# Patient Record
Sex: FEMALE | Race: WHITE | NOT HISPANIC OR LATINO | Employment: FULL TIME | URBAN - METROPOLITAN AREA
[De-identification: names, ages, dates, MRNs, and addresses within clinical notes are randomized per-mention and may not be internally consistent; named-entity substitution may affect disease eponyms.]

---

## 2017-06-15 ENCOUNTER — OFFICE VISIT (OUTPATIENT)
Dept: OBSTETRICS AND GYNECOLOGY | Facility: CLINIC | Age: 27
End: 2017-06-15
Attending: OBSTETRICS & GYNECOLOGY
Payer: COMMERCIAL

## 2017-06-15 VITALS
HEIGHT: 63 IN | WEIGHT: 101.31 LBS | DIASTOLIC BLOOD PRESSURE: 78 MMHG | BODY MASS INDEX: 17.95 KG/M2 | SYSTOLIC BLOOD PRESSURE: 128 MMHG

## 2017-06-15 DIAGNOSIS — Z01.419 ENCOUNTER FOR GYNECOLOGICAL EXAMINATION: Primary | ICD-10-CM

## 2017-06-15 PROBLEM — F41.1 ANXIETY, GENERALIZED: Status: ACTIVE | Noted: 2017-04-03

## 2017-06-15 PROBLEM — E55.9 UNSPECIFIED VITAMIN D DEFICIENCY: Status: ACTIVE | Noted: 2017-04-03

## 2017-06-15 PROCEDURE — 99999 PR PBB SHADOW E&M-EST. PATIENT-LVL II: CPT | Mod: PBBFAC,,, | Performed by: OBSTETRICS & GYNECOLOGY

## 2017-06-15 PROCEDURE — 99395 PREV VISIT EST AGE 18-39: CPT | Mod: S$GLB,,, | Performed by: OBSTETRICS & GYNECOLOGY

## 2017-06-15 RX ORDER — NORETHINDRONE ACETATE AND ETHINYL ESTRADIOL, AND FERROUS FUMARATE 1MG-20(24)
1 KIT ORAL DAILY
Qty: 84 CAPSULE | Refills: 3 | Status: SHIPPED | OUTPATIENT
Start: 2017-06-15 | End: 2018-03-06 | Stop reason: SDUPTHER

## 2017-06-15 RX ORDER — ERGOCALCIFEROL 1.25 MG/1
50000 CAPSULE ORAL
COMMUNITY
Start: 2017-04-04 | End: 2018-04-04

## 2017-06-15 NOTE — PROGRESS NOTES
"CC: Well woman exam    Juanita Redman is a 26 y.o. female  presents for a well woman exam. Got engaged and getting  wants ocps.  Using condoms    Past Medical History:   Diagnosis Date    Anxiety     Vitamin D deficiency        History reviewed. No pertinent surgical history.    OB History    Para Term  AB Living   0 0 0 0 0 0   SAB TAB Ectopic Multiple Live Births   0 0 0 0              Family History   Problem Relation Age of Onset    Hypertension Father     Breast cancer Neg Hx     Colon cancer Neg Hx     Ovarian cancer Neg Hx        Social History   Substance Use Topics    Smoking status: Never Smoker    Smokeless tobacco: Not on file    Alcohol use Yes      Comment: rare       /78   Ht 5' 3" (1.6 m)   Wt 45.9 kg (101 lb 4.8 oz)   LMP 2017 (Exact Date)   BMI 17.94 kg/m²     ROS:  GENERAL: Denies weight gain or weight loss. Feeling well overall.   SKIN: Denies rash or lesions.   HEAD: Denies head injury or headache.   NODES: Denies enlarged lymph nodes.   CHEST: Denies chest pain or shortness of breath.   CARDIOVASCULAR: Denies palpitations or left sided chest pain.   ABDOMEN: No abdominal pain, constipation, diarrhea, nausea, vomiting or rectal bleeding.   URINARY: No frequency, dysuria, hematuria, or burning on urination.  REPRODUCTIVE: See HPI.   BREASTS: The patient performs breast self-examination and denies pain, lumps, or nipple discharge.   HEMATOLOGIC: No easy bruisability or excessive bleeding.  MUSCULOSKELETAL: Denies joint pain or swelling.   NEUROLOGIC: Denies syncope or weakness.   PSYCHIATRIC: Denies depression, anxiety or mood swings.    Physical Exam:    APPEARANCE: Well nourished, well developed, in no acute distress.  AFFECT: WNL, alert and oriented x 3  SKIN: No acne or hirsutism  NECK: Neck symmetric without masses or thyromegaly  NODES: No inguinal, cervical, axillary, or femoral lymph node enlargement  CHEST: Good respiratory " effect  ABDOMEN: Soft.  No tenderness or masses.  No hepatosplenomegaly.  No hernias.  BREASTS: Symmetrical, no skin changes or visible lesions.  No palpable masses, nipple discharge bilaterally.  PELVIC: Normal external genitalia without lesions.  Normal hair distribution.  Adequate perineal body, normal urethral meatus.  Vagina moist and well rugated without lesions or discharge.  Cervix pink, without lesions, discharge or tenderness.  No significant cystocele or rectocele.  Bimanual exam shows uterus to be normal size, regular, mobile and nontender.  Adnexa without masses or tenderness.    EXTREMITIES: No edema.    ASSESSMENT AND PLAN  1. Encounter for gynecological examination         Patient was counseled today on A.C.S. Pap guidelines and recommendations for yearly pelvic exams, mammograms and monthly self breast exams; to see her PCP for other health maintenance.     Return in about 1 year (around 6/15/2018).

## 2017-06-30 ENCOUNTER — TELEPHONE (OUTPATIENT)
Dept: OBSTETRICS AND GYNECOLOGY | Facility: CLINIC | Age: 27
End: 2017-06-30

## 2017-06-30 NOTE — TELEPHONE ENCOUNTER
Reassured her she can start taking Taytulla on the 2nd day of her period.  She started her period yesterday but had a headache last night and the pharmacy recommended she not take it while she had a headache.

## 2017-06-30 NOTE — TELEPHONE ENCOUNTER
Rashawn pt, was told by Dr Morocho to start taking taytulla the night she starts her period. Pt started period yesterday but had headache last night and pharmacist told her not to take the meds until her headache went away but it did not go away last night. Should pt start meds tonight on second day of period or wait until period is over?

## 2018-02-20 ENCOUNTER — TELEPHONE (OUTPATIENT)
Dept: OBSTETRICS AND GYNECOLOGY | Facility: CLINIC | Age: 28
End: 2018-02-20

## 2018-02-20 NOTE — TELEPHONE ENCOUNTER
Wax pt states she has been taking taytulla for aprox 6/7 months, pt had normal bleeding in the beginning, but now, yesterday pt started spotting on sugar pills, today pt states she is bleeding like a real period, bright red, passed a small clot. Pt not sure what is going on. Pt cycle has been consistent, up until now.    [phone]

## 2018-02-21 NOTE — TELEPHONE ENCOUNTER
Pt has been on Taytulla for 7 months. She is in the 2nd week of pill pack and missed a pill Monday night but took it the following morning.  Yesterday she woke up with hot flashes and brown spotting.  During the day it increased and was bright red with moderate cramping then went back to spotting last night.  No symptoms this AM. Negative UPT.  Reassured her that the bleeding is probably due to missing a pill earlier in the week.  Advised if she has BTB without missing a pill to call for an US.  She does not want to switch OCP, she has done well on this pill.     Pt states in October she missed 2 pills in the first week then doubled up Sunday and Monday but didn't have any bleeding.  Advised it was probably because it was in the first week and she doubled up for 2 days.

## 2018-03-06 RX ORDER — NORETHINDRONE ACETATE AND ETHINYL ESTRADIOL, AND FERROUS FUMARATE 1MG-20(24)
1 KIT ORAL DAILY
Qty: 84 CAPSULE | Refills: 1 | Status: SHIPPED | OUTPATIENT
Start: 2018-03-06 | End: 2018-06-22

## 2018-04-02 ENCOUNTER — TELEPHONE (OUTPATIENT)
Dept: OBSTETRICS AND GYNECOLOGY | Facility: CLINIC | Age: 28
End: 2018-04-02

## 2018-04-02 NOTE — TELEPHONE ENCOUNTER
Pt said about 3 weeks ago she started with a discomfort in her upper right inner thigh. She said it is not really a pain and it is not red or swollen and there is no extra pain with applied pressure. She has tried taking tylenol but it does not mask the pain completely. Pt is worried it is something related to her ocp. Pt said she can call her PCP if she needs to but wanted to check with  first.

## 2018-04-02 NOTE — TELEPHONE ENCOUNTER
Spoke with pt, states about 3 wks ago, started with dull ache on right inner thigh towards knee cap. Doesn't change location or intensity unless area aggravated. States her grandmother who is a nurse took a look at the area but found no visible abnormality like redness or swelling. States she still is exercising as normal, no other changes to lifestyle. Reassured pt that she is low risk for DVT, likely not the case. Confirmed with pt she can seek care from PCP to see about possibility of musculoskeletal or other injury. Pt agrees, states she is being over-cautious.

## 2018-06-22 ENCOUNTER — OFFICE VISIT (OUTPATIENT)
Dept: OBSTETRICS AND GYNECOLOGY | Facility: CLINIC | Age: 28
End: 2018-06-22
Payer: COMMERCIAL

## 2018-06-22 VITALS
BODY MASS INDEX: 18.44 KG/M2 | HEIGHT: 63 IN | DIASTOLIC BLOOD PRESSURE: 78 MMHG | SYSTOLIC BLOOD PRESSURE: 126 MMHG | WEIGHT: 104.06 LBS

## 2018-06-22 DIAGNOSIS — Z01.419 ENCOUNTER FOR GYNECOLOGICAL EXAMINATION: Primary | ICD-10-CM

## 2018-06-22 PROCEDURE — 99999 PR PBB SHADOW E&M-EST. PATIENT-LVL III: CPT | Mod: PBBFAC,,, | Performed by: OBSTETRICS & GYNECOLOGY

## 2018-06-22 PROCEDURE — 99395 PREV VISIT EST AGE 18-39: CPT | Mod: S$GLB,,, | Performed by: OBSTETRICS & GYNECOLOGY

## 2018-06-22 NOTE — PROGRESS NOTES
"CC: Well woman exam    Juanita Taylor is a 27 y.o. female  presents for a well woman exam.  Last pap was 2016 was normal.  Off ocps 1 month and wants to get pregnant.  [facility] [place] [facility] to there.  Is a consult with new gyn there.      Past Medical History:   Diagnosis Date    Anxiety     Vitamin D deficiency        History reviewed. No pertinent surgical history.    OB History    Para Term  AB Living   0 0 0 0 0 0   SAB TAB Ectopic Multiple Live Births   0 0 0 0               Family History   Problem Relation Age of Onset    Hypertension Father     Breast cancer Neg Hx     Colon cancer Neg Hx     Ovarian cancer Neg Hx        Social History   Substance Use Topics    Smoking status: Never Smoker    Smokeless tobacco: Never Used    Alcohol use No       /78   Ht 5' 3" (1.6 m)   Wt 47.2 kg (104 lb 0.9 oz)   LMP 2018   BMI 18.43 kg/m²     ROS:  GENERAL: Denies weight gain or weight loss. Feeling well overall.   SKIN: Denies rash or lesions.   HEAD: Denies head injury or headache.   NODES: Denies enlarged lymph nodes.   CHEST: Denies chest pain or shortness of breath.   CARDIOVASCULAR: Denies palpitations or left sided chest pain.   ABDOMEN: No abdominal pain, constipation, diarrhea, nausea, vomiting or rectal bleeding.   URINARY: No frequency, dysuria, hematuria, or burning on urination.  REPRODUCTIVE: See HPI.   BREASTS: The patient performs breast self-examination and denies pain, lumps, or nipple discharge.   HEMATOLOGIC: No easy bruisability or excessive bleeding.  MUSCULOSKELETAL: Denies joint pain or swelling.   NEUROLOGIC: Denies syncope or weakness.   PSYCHIATRIC: Denies depression, anxiety or mood swings.    Physical Exam:    APPEARANCE: Well nourished, well developed, in no acute distress.  AFFECT: WNL, alert and oriented x 3  SKIN: No acne or hirsutism  NECK: Neck symmetric without masses or thyromegaly  NODES: No inguinal, " cervical, axillary, or femoral lymph node enlargement  CHEST: Good respiratory effect  ABDOMEN: Soft.  No tenderness or masses.  No hepatosplenomegaly.  No hernias.  BREASTS: Symmetrical, no skin changes or visible lesions.  No palpable masses, nipple discharge bilaterally.  PELVIC: Normal external genitalia without lesions.  Normal hair distribution.  Adequate perineal body, normal urethral meatus.  Vagina moist and well rugated without lesions or discharge.  Cervix pink, without lesions, discharge or tenderness.  No significant cystocele or rectocele.  Bimanual exam shows uterus to be normal size, regular, mobile and nontender.  Adnexa without masses or tenderness.    EXTREMITIES: No edema.    ASSESSMENT AND PLAN  1. Encounter for gynecological examination         Patient was counseled today on A.C.S. Pap guidelines and recommendations for yearly pelvic exams, mammograms and monthly self breast exams; to see her PCP for other health maintenance.     Folic acid.     Follow-up in about 1 year (around 6/22/2019).

## 2018-06-26 ENCOUNTER — PATIENT MESSAGE (OUTPATIENT)
Dept: OBSTETRICS AND GYNECOLOGY | Facility: CLINIC | Age: 28
End: 2018-06-26

## 2018-07-05 ENCOUNTER — TELEPHONE (OUTPATIENT)
Dept: OBSTETRICS AND GYNECOLOGY | Facility: CLINIC | Age: 28
End: 2018-07-05

## 2018-07-05 NOTE — TELEPHONE ENCOUNTER
Dr. Morocho-- pt would like to speak to Ramya. Would not say what it was regarding. Pt's # [phone]

## 2018-07-05 NOTE — TELEPHONE ENCOUNTER
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 States if she does not like her though, will come back to Dr. Morocho like they talked about at her recent appt.

## 2021-01-26 ENCOUNTER — TELEPHONE (OUTPATIENT)
Dept: PRIMARY CARE CLINIC | Facility: CLINIC | Age: 31
End: 2021-01-26

## 2021-01-26 ENCOUNTER — CLINICAL SUPPORT (OUTPATIENT)
Dept: URGENT CARE | Facility: CLINIC | Age: 31
End: 2021-01-26
Payer: COMMERCIAL

## 2021-01-26 DIAGNOSIS — J98.8 CONGESTION OF UPPER AIRWAY: ICD-10-CM

## 2021-01-26 DIAGNOSIS — R09.89 RUNNY NOSE: Primary | ICD-10-CM

## 2021-01-26 DIAGNOSIS — R09.89 RUNNY NOSE: ICD-10-CM

## 2021-01-26 LAB
CTP QC/QA: YES
SARS-COV-2 RDRP RESP QL NAA+PROBE: NEGATIVE

## 2021-01-26 PROCEDURE — U0002: ICD-10-PCS | Mod: QW,S$GLB,, | Performed by: INTERNAL MEDICINE

## 2021-01-26 PROCEDURE — U0002 COVID-19 LAB TEST NON-CDC: HCPCS | Mod: QW,S$GLB,, | Performed by: INTERNAL MEDICINE

## 2021-02-05 ENCOUNTER — CLINICAL SUPPORT (OUTPATIENT)
Dept: URGENT CARE | Facility: CLINIC | Age: 31
End: 2021-02-05
Payer: COMMERCIAL

## 2021-02-05 DIAGNOSIS — Z20.822 EXPOSURE TO COVID-19 VIRUS: Primary | ICD-10-CM

## 2021-02-05 LAB
CTP QC/QA: YES
SARS-COV-2 RDRP RESP QL NAA+PROBE: NEGATIVE

## 2021-02-05 PROCEDURE — 99211 PR OFFICE/OUTPT VISIT, EST, LEVL I: ICD-10-PCS | Mod: S$GLB,CS,, | Performed by: NURSE PRACTITIONER

## 2021-02-05 PROCEDURE — U0002: ICD-10-PCS | Mod: QW,S$GLB,, | Performed by: NURSE PRACTITIONER

## 2021-02-05 PROCEDURE — U0002 COVID-19 LAB TEST NON-CDC: HCPCS | Mod: QW,S$GLB,, | Performed by: NURSE PRACTITIONER

## 2021-02-05 PROCEDURE — 99211 OFF/OP EST MAY X REQ PHY/QHP: CPT | Mod: S$GLB,CS,, | Performed by: NURSE PRACTITIONER

## 2021-04-29 ENCOUNTER — PATIENT MESSAGE (OUTPATIENT)
Dept: RESEARCH | Facility: HOSPITAL | Age: 31
End: 2021-04-29

## 2021-06-29 ENCOUNTER — OFFICE VISIT (OUTPATIENT)
Dept: URGENT CARE | Facility: CLINIC | Age: 31
End: 2021-06-29
Payer: COMMERCIAL

## 2021-06-29 VITALS
DIASTOLIC BLOOD PRESSURE: 71 MMHG | OXYGEN SATURATION: 100 % | SYSTOLIC BLOOD PRESSURE: 108 MMHG | HEIGHT: 63 IN | RESPIRATION RATE: 16 BRPM | HEART RATE: 78 BPM | BODY MASS INDEX: 18.07 KG/M2 | TEMPERATURE: 99 F | WEIGHT: 102 LBS

## 2021-06-29 DIAGNOSIS — R09.82 ALLERGIC RHINITIS WITH POSTNASAL DRIP: Primary | ICD-10-CM

## 2021-06-29 DIAGNOSIS — J30.9 ALLERGIC RHINITIS WITH POSTNASAL DRIP: Primary | ICD-10-CM

## 2021-06-29 DIAGNOSIS — R09.81 NASAL CONGESTION: ICD-10-CM

## 2021-06-29 PROCEDURE — 99213 OFFICE O/P EST LOW 20 MIN: CPT | Mod: S$GLB,,, | Performed by: NURSE PRACTITIONER

## 2021-06-29 PROCEDURE — 3008F PR BODY MASS INDEX (BMI) DOCUMENTED: ICD-10-PCS | Mod: CPTII,S$GLB,, | Performed by: NURSE PRACTITIONER

## 2021-06-29 PROCEDURE — 1126F AMNT PAIN NOTED NONE PRSNT: CPT | Mod: S$GLB,,, | Performed by: NURSE PRACTITIONER

## 2021-06-29 PROCEDURE — 99213 PR OFFICE/OUTPT VISIT, EST, LEVL III, 20-29 MIN: ICD-10-PCS | Mod: S$GLB,,, | Performed by: NURSE PRACTITIONER

## 2021-06-29 PROCEDURE — 1126F PR PAIN SEVERITY QUANTIFIED, NO PAIN PRESENT: ICD-10-PCS | Mod: S$GLB,,, | Performed by: NURSE PRACTITIONER

## 2021-06-29 PROCEDURE — 3008F BODY MASS INDEX DOCD: CPT | Mod: CPTII,S$GLB,, | Performed by: NURSE PRACTITIONER

## 2021-06-29 RX ORDER — DEXBROMPHENIRAMINE MALEATE AND PHENYLEPHRINE HYDROCHLORIDE 2; 10 MG/1; MG/1
1 TABLET ORAL EVERY 6 HOURS PRN
Qty: 28 TABLET | Refills: 0 | Status: SHIPPED | OUTPATIENT
Start: 2021-06-29 | End: 2021-11-02

## 2021-06-29 RX ORDER — PREDNISONE 20 MG/1
20 TABLET ORAL 2 TIMES DAILY
Qty: 10 TABLET | Refills: 0 | Status: SHIPPED | OUTPATIENT
Start: 2021-06-29 | End: 2021-07-04

## 2021-07-02 ENCOUNTER — TELEPHONE (OUTPATIENT)
Dept: URGENT CARE | Facility: CLINIC | Age: 31
End: 2021-07-02

## 2021-10-21 ENCOUNTER — OFFICE VISIT (OUTPATIENT)
Dept: URGENT CARE | Facility: CLINIC | Age: 31
End: 2021-10-21
Payer: COMMERCIAL

## 2021-10-21 VITALS
SYSTOLIC BLOOD PRESSURE: 111 MMHG | WEIGHT: 102 LBS | DIASTOLIC BLOOD PRESSURE: 69 MMHG | HEIGHT: 63 IN | BODY MASS INDEX: 18.07 KG/M2 | TEMPERATURE: 98 F | OXYGEN SATURATION: 98 % | HEART RATE: 95 BPM | RESPIRATION RATE: 18 BRPM

## 2021-10-21 DIAGNOSIS — J06.9 VIRAL URI WITH COUGH: Primary | ICD-10-CM

## 2021-10-21 DIAGNOSIS — Z20.822 ENCOUNTER FOR LABORATORY TESTING FOR COVID-19 VIRUS: ICD-10-CM

## 2021-10-21 LAB
CTP QC/QA: YES
SARS-COV-2 RDRP RESP QL NAA+PROBE: NEGATIVE

## 2021-10-21 PROCEDURE — 3078F PR MOST RECENT DIASTOLIC BLOOD PRESSURE < 80 MM HG: ICD-10-PCS | Mod: CPTII,S$GLB,, | Performed by: STUDENT IN AN ORGANIZED HEALTH CARE EDUCATION/TRAINING PROGRAM

## 2021-10-21 PROCEDURE — 3008F PR BODY MASS INDEX (BMI) DOCUMENTED: ICD-10-PCS | Mod: CPTII,S$GLB,, | Performed by: STUDENT IN AN ORGANIZED HEALTH CARE EDUCATION/TRAINING PROGRAM

## 2021-10-21 PROCEDURE — 3074F PR MOST RECENT SYSTOLIC BLOOD PRESSURE < 130 MM HG: ICD-10-PCS | Mod: CPTII,S$GLB,, | Performed by: STUDENT IN AN ORGANIZED HEALTH CARE EDUCATION/TRAINING PROGRAM

## 2021-10-21 PROCEDURE — 1159F PR MEDICATION LIST DOCUMENTED IN MEDICAL RECORD: ICD-10-PCS | Mod: CPTII,S$GLB,, | Performed by: STUDENT IN AN ORGANIZED HEALTH CARE EDUCATION/TRAINING PROGRAM

## 2021-10-21 PROCEDURE — U0002: ICD-10-PCS | Mod: QW,S$GLB,, | Performed by: STUDENT IN AN ORGANIZED HEALTH CARE EDUCATION/TRAINING PROGRAM

## 2021-10-21 PROCEDURE — 3078F DIAST BP <80 MM HG: CPT | Mod: CPTII,S$GLB,, | Performed by: STUDENT IN AN ORGANIZED HEALTH CARE EDUCATION/TRAINING PROGRAM

## 2021-10-21 PROCEDURE — 1159F MED LIST DOCD IN RCRD: CPT | Mod: CPTII,S$GLB,, | Performed by: STUDENT IN AN ORGANIZED HEALTH CARE EDUCATION/TRAINING PROGRAM

## 2021-10-21 PROCEDURE — U0002 COVID-19 LAB TEST NON-CDC: HCPCS | Mod: QW,S$GLB,, | Performed by: STUDENT IN AN ORGANIZED HEALTH CARE EDUCATION/TRAINING PROGRAM

## 2021-10-21 PROCEDURE — 99213 PR OFFICE/OUTPT VISIT, EST, LEVL III, 20-29 MIN: ICD-10-PCS | Mod: S$GLB,,, | Performed by: STUDENT IN AN ORGANIZED HEALTH CARE EDUCATION/TRAINING PROGRAM

## 2021-10-21 PROCEDURE — 3008F BODY MASS INDEX DOCD: CPT | Mod: CPTII,S$GLB,, | Performed by: STUDENT IN AN ORGANIZED HEALTH CARE EDUCATION/TRAINING PROGRAM

## 2021-10-21 PROCEDURE — 3074F SYST BP LT 130 MM HG: CPT | Mod: CPTII,S$GLB,, | Performed by: STUDENT IN AN ORGANIZED HEALTH CARE EDUCATION/TRAINING PROGRAM

## 2021-10-21 PROCEDURE — 1160F PR REVIEW ALL MEDS BY PRESCRIBER/CLIN PHARMACIST DOCUMENTED: ICD-10-PCS | Mod: CPTII,S$GLB,, | Performed by: STUDENT IN AN ORGANIZED HEALTH CARE EDUCATION/TRAINING PROGRAM

## 2021-10-21 PROCEDURE — 99213 OFFICE O/P EST LOW 20 MIN: CPT | Mod: S$GLB,,, | Performed by: STUDENT IN AN ORGANIZED HEALTH CARE EDUCATION/TRAINING PROGRAM

## 2021-10-21 PROCEDURE — 1160F RVW MEDS BY RX/DR IN RCRD: CPT | Mod: CPTII,S$GLB,, | Performed by: STUDENT IN AN ORGANIZED HEALTH CARE EDUCATION/TRAINING PROGRAM

## 2021-10-24 ENCOUNTER — TELEPHONE (OUTPATIENT)
Dept: URGENT CARE | Facility: CLINIC | Age: 31
End: 2021-10-24
Payer: COMMERCIAL

## 2021-12-30 ENCOUNTER — LAB VISIT (OUTPATIENT)
Dept: LAB | Facility: HOSPITAL | Age: 31
End: 2021-12-30
Attending: OBSTETRICS & GYNECOLOGY
Payer: COMMERCIAL

## 2021-12-30 DIAGNOSIS — Z36.9 UNSPECIFIED ANTENATAL SCREENING: Primary | ICD-10-CM

## 2021-12-30 DIAGNOSIS — Z36.9 UNSPECIFIED ANTENATAL SCREENING: ICD-10-CM

## 2021-12-30 LAB
BASOPHILS # BLD AUTO: 0.04 K/UL (ref 0–0.2)
BASOPHILS NFR BLD: 0.5 % (ref 0–1.9)
DIFFERENTIAL METHOD: ABNORMAL
EOSINOPHIL # BLD AUTO: 0 K/UL (ref 0–0.5)
EOSINOPHIL NFR BLD: 0.3 % (ref 0–8)
ERYTHROCYTE [DISTWIDTH] IN BLOOD BY AUTOMATED COUNT: 12.9 % (ref 11.5–14.5)
GLUCOSE SERPL-MCNC: 80 MG/DL (ref 70–140)
HCT VFR BLD AUTO: 37.4 % (ref 37–48.5)
HGB BLD-MCNC: 12 G/DL (ref 12–16)
IMM GRANULOCYTES # BLD AUTO: 0.13 K/UL (ref 0–0.04)
IMM GRANULOCYTES NFR BLD AUTO: 1.7 % (ref 0–0.5)
LYMPHOCYTES # BLD AUTO: 1.3 K/UL (ref 1–4.8)
LYMPHOCYTES NFR BLD: 16.7 % (ref 18–48)
MCH RBC QN AUTO: 35.4 PG (ref 27–31)
MCHC RBC AUTO-ENTMCNC: 32.1 G/DL (ref 32–36)
MCV RBC AUTO: 110 FL (ref 82–98)
MONOCYTES # BLD AUTO: 0.6 K/UL (ref 0.3–1)
MONOCYTES NFR BLD: 7.6 % (ref 4–15)
NEUTROPHILS # BLD AUTO: 5.6 K/UL (ref 1.8–7.7)
NEUTROPHILS NFR BLD: 73.2 % (ref 38–73)
NRBC BLD-RTO: 0 /100 WBC
PLATELET # BLD AUTO: 189 K/UL (ref 150–450)
PMV BLD AUTO: 10.9 FL (ref 9.2–12.9)
RBC # BLD AUTO: 3.39 M/UL (ref 4–5.4)
WBC # BLD AUTO: 7.66 K/UL (ref 3.9–12.7)

## 2021-12-30 PROCEDURE — 85025 COMPLETE CBC W/AUTO DIFF WBC: CPT | Performed by: OBSTETRICS & GYNECOLOGY

## 2021-12-30 PROCEDURE — 82950 GLUCOSE TEST: CPT | Performed by: OBSTETRICS & GYNECOLOGY

## 2021-12-30 PROCEDURE — 87389 HIV-1 AG W/HIV-1&-2 AB AG IA: CPT | Performed by: OBSTETRICS & GYNECOLOGY

## 2021-12-30 PROCEDURE — 36415 COLL VENOUS BLD VENIPUNCTURE: CPT | Performed by: OBSTETRICS & GYNECOLOGY

## 2021-12-30 PROCEDURE — 86592 SYPHILIS TEST NON-TREP QUAL: CPT | Performed by: OBSTETRICS & GYNECOLOGY

## 2021-12-31 LAB
HIV 1+2 AB+HIV1 P24 AG SERPL QL IA: NEGATIVE
RPR SER QL: NORMAL

## 2022-02-03 ENCOUNTER — OFFICE VISIT (OUTPATIENT)
Dept: URGENT CARE | Facility: CLINIC | Age: 32
End: 2022-02-03
Payer: COMMERCIAL

## 2022-02-03 VITALS
BODY MASS INDEX: 20.2 KG/M2 | DIASTOLIC BLOOD PRESSURE: 66 MMHG | SYSTOLIC BLOOD PRESSURE: 116 MMHG | HEART RATE: 93 BPM | TEMPERATURE: 98 F | WEIGHT: 114 LBS | RESPIRATION RATE: 14 BRPM | HEIGHT: 63 IN | OXYGEN SATURATION: 100 %

## 2022-02-03 DIAGNOSIS — J06.9 VIRAL URI: ICD-10-CM

## 2022-02-03 DIAGNOSIS — R09.81 SINUS CONGESTION: ICD-10-CM

## 2022-02-03 DIAGNOSIS — R09.81 NASAL CONGESTION: Primary | ICD-10-CM

## 2022-02-03 LAB
CTP QC/QA: YES
SARS-COV-2 RDRP RESP QL NAA+PROBE: NEGATIVE

## 2022-02-03 PROCEDURE — 3074F PR MOST RECENT SYSTOLIC BLOOD PRESSURE < 130 MM HG: ICD-10-PCS | Mod: CPTII,S$GLB,, | Performed by: INTERNAL MEDICINE

## 2022-02-03 PROCEDURE — 3008F PR BODY MASS INDEX (BMI) DOCUMENTED: ICD-10-PCS | Mod: CPTII,S$GLB,, | Performed by: INTERNAL MEDICINE

## 2022-02-03 PROCEDURE — U0002 COVID-19 LAB TEST NON-CDC: HCPCS | Mod: QW,S$GLB,, | Performed by: INTERNAL MEDICINE

## 2022-02-03 PROCEDURE — 3078F DIAST BP <80 MM HG: CPT | Mod: CPTII,S$GLB,, | Performed by: INTERNAL MEDICINE

## 2022-02-03 PROCEDURE — 99211 PR OFFICE/OUTPT VISIT, EST, LEVL I: ICD-10-PCS | Mod: S$GLB,,, | Performed by: INTERNAL MEDICINE

## 2022-02-03 PROCEDURE — 99211 OFF/OP EST MAY X REQ PHY/QHP: CPT | Mod: S$GLB,,, | Performed by: INTERNAL MEDICINE

## 2022-02-03 PROCEDURE — 3008F BODY MASS INDEX DOCD: CPT | Mod: CPTII,S$GLB,, | Performed by: INTERNAL MEDICINE

## 2022-02-03 PROCEDURE — 1159F MED LIST DOCD IN RCRD: CPT | Mod: CPTII,S$GLB,, | Performed by: INTERNAL MEDICINE

## 2022-02-03 PROCEDURE — 1159F PR MEDICATION LIST DOCUMENTED IN MEDICAL RECORD: ICD-10-PCS | Mod: CPTII,S$GLB,, | Performed by: INTERNAL MEDICINE

## 2022-02-03 PROCEDURE — 3078F PR MOST RECENT DIASTOLIC BLOOD PRESSURE < 80 MM HG: ICD-10-PCS | Mod: CPTII,S$GLB,, | Performed by: INTERNAL MEDICINE

## 2022-02-03 PROCEDURE — U0002: ICD-10-PCS | Mod: QW,S$GLB,, | Performed by: INTERNAL MEDICINE

## 2022-02-03 PROCEDURE — 3074F SYST BP LT 130 MM HG: CPT | Mod: CPTII,S$GLB,, | Performed by: INTERNAL MEDICINE

## 2022-02-03 NOTE — PATIENT INSTRUCTIONS
Ok to use flonase for nasal/sinus congestion. We will call with results of the covid test.     Drink lots of fluids. Use a humidfier to help with nasal congestion

## 2022-02-03 NOTE — PROGRESS NOTES
"Subjective:       Patient ID: Juanita Taylor is a 31 y.o. female.    Vitals:  height is 5' 3" (1.6 m) and weight is 51.7 kg (114 lb). Her temperature is 98.1 °F (36.7 °C). Her blood pressure is 116/66 and her pulse is 93. Her respiration is 14 and oxygen saturation is 100%.     Chief Complaint: Nasal Congestion (Entered by patient)    Pt c/o stuffy nose, headache, nasal congestion, sneezing starting Monday.  Pt is vaccinated. Pt states son has sinus infection- on antibiotics.     URI   This is a new problem. The current episode started in the past 7 days (Monday 1/31/22). The problem has been unchanged. There has been no fever. Associated symptoms include congestion, headaches and sneezing. Pertinent negatives include no abdominal pain, chest pain, coughing, diarrhea, dysuria, ear pain, joint pain, joint swelling, nausea, neck pain, plugged ear sensation, rash, rhinorrhea, sinus pain, sore throat, swollen glands, vomiting or wheezing.       HENT: Positive for congestion. Negative for ear pain, sinus pain and sore throat.    Neck: Negative for neck pain.   Cardiovascular: Negative for chest pain.   Respiratory: Negative for cough and wheezing.    Gastrointestinal: Negative for abdominal pain, nausea, vomiting and diarrhea.   Genitourinary: Negative for dysuria.   Skin: Negative for rash.   Allergic/Immunologic: Positive for sneezing.   Neurological: Positive for headaches.       Objective:      Physical Exam      Results for orders placed or performed in visit on 02/03/22   POCT COVID-19 Rapid Screening   Result Value Ref Range    POC Rapid COVID Negative Negative     Acceptable Yes        Assessment:       1. Nasal congestion    2. Sinus congestion    3. Viral URI          Plan:         Nasal congestion  -     POCT COVID-19 Rapid Screening    Sinus congestion    Viral URI                     "

## 2022-02-18 ENCOUNTER — OFFICE VISIT (OUTPATIENT)
Dept: URGENT CARE | Facility: CLINIC | Age: 32
End: 2022-02-18
Payer: COMMERCIAL

## 2022-02-18 VITALS
RESPIRATION RATE: 18 BRPM | DIASTOLIC BLOOD PRESSURE: 57 MMHG | SYSTOLIC BLOOD PRESSURE: 100 MMHG | WEIGHT: 114 LBS | TEMPERATURE: 99 F | HEIGHT: 63 IN | OXYGEN SATURATION: 98 % | BODY MASS INDEX: 20.2 KG/M2 | HEART RATE: 103 BPM

## 2022-02-18 DIAGNOSIS — R50.9 FEVER, UNSPECIFIED FEVER CAUSE: Primary | ICD-10-CM

## 2022-02-18 DIAGNOSIS — Z20.822 ENCOUNTER FOR LABORATORY TESTING FOR COVID-19 VIRUS: ICD-10-CM

## 2022-02-18 DIAGNOSIS — B34.9 VIRAL SYNDROME: ICD-10-CM

## 2022-02-18 LAB
BILIRUB UR QL STRIP: NEGATIVE
CTP QC/QA: YES
CTP QC/QA: YES
GLUCOSE UR QL STRIP: NEGATIVE
KETONES UR QL STRIP: POSITIVE
LEUKOCYTE ESTERASE UR QL STRIP: NEGATIVE
PH, POC UA: 7.5
POC BLOOD, URINE: NEGATIVE
POC MOLECULAR INFLUENZA A AGN: NEGATIVE
POC MOLECULAR INFLUENZA B AGN: NEGATIVE
POC NITRATES, URINE: NEGATIVE
PROT UR QL STRIP: NEGATIVE
SARS-COV-2 RDRP RESP QL NAA+PROBE: NEGATIVE
SP GR UR STRIP: 1 (ref 1–1.03)
UROBILINOGEN UR STRIP-ACNC: NORMAL (ref 0.1–1.1)

## 2022-02-18 PROCEDURE — U0002: ICD-10-PCS | Mod: QW,S$GLB,, | Performed by: STUDENT IN AN ORGANIZED HEALTH CARE EDUCATION/TRAINING PROGRAM

## 2022-02-18 PROCEDURE — 3008F PR BODY MASS INDEX (BMI) DOCUMENTED: ICD-10-PCS | Mod: CPTII,S$GLB,, | Performed by: STUDENT IN AN ORGANIZED HEALTH CARE EDUCATION/TRAINING PROGRAM

## 2022-02-18 PROCEDURE — 1159F MED LIST DOCD IN RCRD: CPT | Mod: CPTII,S$GLB,, | Performed by: STUDENT IN AN ORGANIZED HEALTH CARE EDUCATION/TRAINING PROGRAM

## 2022-02-18 PROCEDURE — 99214 OFFICE O/P EST MOD 30 MIN: CPT | Mod: S$GLB,,, | Performed by: STUDENT IN AN ORGANIZED HEALTH CARE EDUCATION/TRAINING PROGRAM

## 2022-02-18 PROCEDURE — 99214 PR OFFICE/OUTPT VISIT, EST, LEVL IV, 30-39 MIN: ICD-10-PCS | Mod: S$GLB,,, | Performed by: STUDENT IN AN ORGANIZED HEALTH CARE EDUCATION/TRAINING PROGRAM

## 2022-02-18 PROCEDURE — U0002 COVID-19 LAB TEST NON-CDC: HCPCS | Mod: QW,S$GLB,, | Performed by: STUDENT IN AN ORGANIZED HEALTH CARE EDUCATION/TRAINING PROGRAM

## 2022-02-18 PROCEDURE — 87502 POCT INFLUENZA A/B MOLECULAR: ICD-10-PCS | Mod: QW,S$GLB,, | Performed by: STUDENT IN AN ORGANIZED HEALTH CARE EDUCATION/TRAINING PROGRAM

## 2022-02-18 PROCEDURE — 81003 URINALYSIS AUTO W/O SCOPE: CPT | Mod: QW,S$GLB,, | Performed by: STUDENT IN AN ORGANIZED HEALTH CARE EDUCATION/TRAINING PROGRAM

## 2022-02-18 PROCEDURE — 3074F PR MOST RECENT SYSTOLIC BLOOD PRESSURE < 130 MM HG: ICD-10-PCS | Mod: CPTII,S$GLB,, | Performed by: STUDENT IN AN ORGANIZED HEALTH CARE EDUCATION/TRAINING PROGRAM

## 2022-02-18 PROCEDURE — 3008F BODY MASS INDEX DOCD: CPT | Mod: CPTII,S$GLB,, | Performed by: STUDENT IN AN ORGANIZED HEALTH CARE EDUCATION/TRAINING PROGRAM

## 2022-02-18 PROCEDURE — 3078F PR MOST RECENT DIASTOLIC BLOOD PRESSURE < 80 MM HG: ICD-10-PCS | Mod: CPTII,S$GLB,, | Performed by: STUDENT IN AN ORGANIZED HEALTH CARE EDUCATION/TRAINING PROGRAM

## 2022-02-18 PROCEDURE — 3078F DIAST BP <80 MM HG: CPT | Mod: CPTII,S$GLB,, | Performed by: STUDENT IN AN ORGANIZED HEALTH CARE EDUCATION/TRAINING PROGRAM

## 2022-02-18 PROCEDURE — 1160F PR REVIEW ALL MEDS BY PRESCRIBER/CLIN PHARMACIST DOCUMENTED: ICD-10-PCS | Mod: CPTII,S$GLB,, | Performed by: STUDENT IN AN ORGANIZED HEALTH CARE EDUCATION/TRAINING PROGRAM

## 2022-02-18 PROCEDURE — 87502 INFLUENZA DNA AMP PROBE: CPT | Mod: QW,S$GLB,, | Performed by: STUDENT IN AN ORGANIZED HEALTH CARE EDUCATION/TRAINING PROGRAM

## 2022-02-18 PROCEDURE — 81003 POCT URINALYSIS, DIPSTICK, AUTOMATED, W/O SCOPE: ICD-10-PCS | Mod: QW,S$GLB,, | Performed by: STUDENT IN AN ORGANIZED HEALTH CARE EDUCATION/TRAINING PROGRAM

## 2022-02-18 PROCEDURE — 1160F RVW MEDS BY RX/DR IN RCRD: CPT | Mod: CPTII,S$GLB,, | Performed by: STUDENT IN AN ORGANIZED HEALTH CARE EDUCATION/TRAINING PROGRAM

## 2022-02-18 PROCEDURE — 1159F PR MEDICATION LIST DOCUMENTED IN MEDICAL RECORD: ICD-10-PCS | Mod: CPTII,S$GLB,, | Performed by: STUDENT IN AN ORGANIZED HEALTH CARE EDUCATION/TRAINING PROGRAM

## 2022-02-18 PROCEDURE — 3074F SYST BP LT 130 MM HG: CPT | Mod: CPTII,S$GLB,, | Performed by: STUDENT IN AN ORGANIZED HEALTH CARE EDUCATION/TRAINING PROGRAM

## 2022-02-18 NOTE — PROGRESS NOTES
"Subjective:       Patient ID: Juanita Taylor is a 31 y.o. female.    Vitals:  height is 5' 3" (1.6 m) and weight is 51.7 kg (114 lb). Her temperature is 98.7 °F (37.1 °C). Her blood pressure is 100/57 (abnormal) and her pulse is 103. Her respiration is 18 and oxygen saturation is 98%.     Chief Complaint: Fever (Would like a flu test - Entered by patient)    Pt is  @ 34w1d who presents for fever. Reports on Tuesday had onset of diarrhea and n/v, was [facility] and diagnosed with viral gastroenteritis and given IV fluids. Was feeling better and n/v and diarrhea have resolved, but then Wednesday afternoon began having fever, chills, body aches, and fatigue. Temp Wed was 100.8. Felt better yesterday and returned to normal diet and activity but overnight again awoke with subjective fever and sweats. No known sick contacts. Denied URI sx's, lower respiratory sx's, rash, UTI sx's, vaginal discharge, vaginal bleeding, decreased FM, or frequent contractions.    Fever   This is a new problem. The current episode started in the past 7 days. The problem occurs daily. The problem has been waxing and waning. The maximum temperature noted was 100 to 100.9 F. The temperature was taken using an oral thermometer. Associated symptoms include diarrhea (resolved), muscle aches, nausea (resolved) and vomiting (resolved). Pertinent negatives include no abdominal pain, chest pain, congestion, coughing, ear pain, headaches, rash, sore throat, urinary pain or wheezing. She has tried acetaminophen for the symptoms. The treatment provided moderate relief.   Risk factors: no contaminated food, no contaminated water, no recent sickness, no recent travel and no sick contacts        Constitution: Positive for chills, fatigue and fever.   HENT: Negative.  Negative for ear pain, congestion, sinus pain, sinus pressure and sore throat.    Cardiovascular: Negative for chest pain and sob on exertion.   Eyes: Negative.  "   Respiratory: Negative for cough and wheezing.    Gastrointestinal: Positive for nausea (resolved), vomiting (resolved) and diarrhea (resolved). Negative for abdominal pain.   Genitourinary: Negative for dysuria, frequency, urgency, urine decreased, hematuria, vaginal discharge and vaginal bleeding.   Musculoskeletal: Positive for muscle ache. Negative for joint swelling.   Skin: Negative for rash.   Neurological: Negative for headaches.   Psychiatric/Behavioral: Negative for confusion.       Objective:      Physical Exam   Constitutional: She is oriented to person, place, and time. She appears well-developed. She does not appear ill. No distress.   HENT:   Head: Normocephalic and atraumatic.   Ears:   Right Ear: External ear normal.   Left Ear: External ear normal.   Eyes: Conjunctivae and EOM are normal. Right eye exhibits no discharge. Left eye exhibits no discharge.   Neck: Neck supple.   Cardiovascular: Normal rate, regular rhythm and normal heart sounds.   Pulmonary/Chest: Effort normal and breath sounds normal. No respiratory distress. She has no wheezes. She has no rhonchi. She has no rales.   Abdominal: Bowel sounds are normal. She exhibits no distension. Soft. There is no abdominal tenderness.      Comments: Gravid abdomen without fundal TTP   Musculoskeletal: Normal range of motion.         General: Normal range of motion.   Neurological: She is alert and oriented to person, place, and time. No cranial nerve deficit (CN II-XII grossly intact).   Skin: Skin is warm, dry and no rash.   Psychiatric: Her behavior is normal. Judgment and thought content normal.   Nursing note and vitals reviewed.    Recent Lab Results       02/18/22  1038   02/18/22  1014        POC Blood, Urine Negative         POC Bilirubin, Urine Negative         POC Ketones, Urine Positive  Comment: 5mg/dL         POC Protein, Urine Negative         POC Nitrates, Urine Negative         POC Glucose, Urine Negative         POC Leukocytes,  Urine Negative         POC Urobilinogen, Urine normal         POC Specific Gravity, Urine 1.005         pH, UA 7.5         POC Molecular Influenza A Ag   Negative       POC Molecular Influenza B Ag   Negative        Acceptable   Yes          Yes       SARS-CoV-2 RNA, Amplification, Qual   Negative               Assessment:       1. Fever, unspecified fever cause    2. Viral syndrome    3. Encounter for laboratory testing for COVID-19 virus          Plan:         Fever, unspecified fever cause  -     POCT Influenza A/B MOLECULAR  -     POCT Urinalysis, Dipstick, Automated, W/O Scope  - counseled on OTC Tylenol PRN fever and ER precautions if uncontrolled    Viral syndrome  - suspect viral origin of fever with hx of GI sx's into fever/body aches; UA with ketones and BP low normal with mild tachycardia at triage, though compared to prior vitals over last 2 months appears baseline, but counseled on hydration and fluids    Encounter for laboratory testing for COVID-19 virus  -     POCT COVID-19 Rapid Screening    Results, medications and diagnosis reviewed with patient, questions answered, and return precautions given    Follow up in 5 days (on 2/23/2022) for re-evaluation with OB/Gyn as scheduled, or sooner if worsening.    Nahum Skinner MD/MPH  [facility]  [facility]

## 2022-02-18 NOTE — PATIENT INSTRUCTIONS
Patient Education      You may leave home and/or return to work when the following conditions are met:  · 24 hours fever free without fever-reducing medications AND  · Improved symptoms  · You are fully vaccinated or have not had close contact with someone with COVID-19 (within 6 feet for 15 minutes or more)    If you are fully vaccinated and had a close contact, there is no need for quarantine, unless you develop symptoms.      If you are not fully vaccinated and had a close contact:  · Retest at 5 to 7 days post-exposure  · If possible, it is recommended that you quarantine for 5 days from the time of contact regardless of your test status.  · A mask should be worn indoors post quarantine.       Fever Discharge Instructions, Adult   About this topic   Fever is an increase of the body's temperature. Many things can cause a fever. Fever in adults is often caused by a virus. Antibiotics will not help treat a virus. Most of the time, your fever will go away in a few days.  What care is needed at home?   · Ask your doctor what you need to do when you go home. Make sure you ask questions if you do not understand what the doctor says.  · Drink lots of water, juice, or broth to replace fluids lost from the fever.  · Dress in lightweight clothes. Use a sheet or light blanket if you are cold.  · You may want to take medicine like ibuprofen, naproxen, or acetaminophen to help with fever.  · Stay at home until the fever is gone for 24 hours without the use of fever reducing medicines. If you had an infection, this will help prevent it from spreading to other people.  · Wash your hands often. This will help keep others healthy.     What follow-up care is needed?   Your doctor may ask you to make visits to the office to check on your progress. Be sure to keep these visits.  What drugs may be needed?   The doctor may order drugs to:  · Lower fever  · Treat the problem causing the fever  Will physical activity be limited?   · If  your fever is caused by an infection, stay at home until the fever is gone for 24 hours. This will help prevent the infection from spreading to other people.  · Get lots of rest. Sleep when you are feeling tired. Avoid doing tiring activities.  What problems could happen?   · Loss of body fluids  What can be done to prevent this health problem?   · Wash your hands often with soap and water for at least 20 seconds, especially after coughing or sneezing. Alcohol-based hand sanitizers also work to kill viruses.       When do I need to call the doctor?   · Have a fever of 100.4°F (38°C) or higher and other symptoms like:  ? Trouble breathing.  ? Severe headache and neck stiffness.  ? Confusion or seeing things that are not there.  ? Seizure.  · You have signs of severe fluid loss, such as:  ? No urine for more than 8 hours.  ? You feel very light-headed or like you are going to pass out.  ? You feel weak like you are going to fall.  · You have a fever of 100.4°F (38°C) or higher that lasts for several days or keeps coming back.  · You develop early signs of fluid loss, such as:  ? Dark-colored urine  ? Dry mouth  ? Muscle cramps  ? Lack of energy  ? Feeling light-headed when you get up     · Fever that does not respond to anti-fever drugs  · A high fever between 103°F to 105°F (39.5°C to 40.5°C)  · Problems breathing  · A new rash  · Belly pain that keeps you from eating or sleeping  · Throwing up more than 3 times in the next 48 hours  · You are not feeling better in 2 to 3 days or you are feeling worse  Teach Back: Helping You Understand   The Teach Back Method helps you understand the information we are giving you. After you talk with the staff, tell them in your own words what you learned. This helps to make sure the staff has described each thing clearly. It also helps to explain things that may have been confusing. Before going home, make sure you can do these:  · I can tell you about my condition.  · I can tell  you what I can do to help avoid passing the infection to others.  · I can tell you what I will do if I have dark colored or no urine, dry mouth, cracked lips, or a lack of energy.  Where can I learn more?   [facility]  https://www.health.govt.nz/your-health/conditions-and-treatments/diseases-and-illnesses/fever/fever-adults   [facility]  https://www.nhsinform.scot/illnesses-and-conditions/infections-and-poisoning/fever-in-adults   Last Reviewed Date   2021-08-16  Consumer Information Use and Disclaimer   This information is not specific medical advice and does not replace information you receive from your health care provider. This is only a brief summary of general information. It does NOT include all information about conditions, illnesses, injuries, tests, procedures, treatments, therapies, discharge instructions or life-style choices that may apply to you. You must talk with your health care provider for complete information about your health and treatment options. This information should not be used to decide whether or not to accept your health care providers advice, instructions or recommendations. Only your health care provider has the knowledge and training to provide advice that is right for you.  Copyright   Copyright © 2021 [employer] and its affiliates and/or licensors. All rights reserved.

## 2022-02-21 ENCOUNTER — TELEPHONE (OUTPATIENT)
Dept: URGENT CARE | Facility: CLINIC | Age: 32
End: 2022-02-21
Payer: COMMERCIAL

## 2022-08-30 ENCOUNTER — LAB VISIT (OUTPATIENT)
Dept: LAB | Facility: HOSPITAL | Age: 32
End: 2022-08-30
Attending: OBSTETRICS & GYNECOLOGY
Payer: COMMERCIAL

## 2022-08-30 DIAGNOSIS — Z32.01 PREGNANCY EXAMINATION OR TEST, POSITIVE RESULT: ICD-10-CM

## 2022-08-30 PROCEDURE — 85025 COMPLETE CBC W/AUTO DIFF WBC: CPT | Performed by: OBSTETRICS & GYNECOLOGY

## 2022-08-30 PROCEDURE — 86901 BLOOD TYPING SEROLOGIC RH(D): CPT | Performed by: OBSTETRICS & GYNECOLOGY

## 2022-08-30 PROCEDURE — 84443 ASSAY THYROID STIM HORMONE: CPT | Performed by: OBSTETRICS & GYNECOLOGY

## 2022-08-30 PROCEDURE — 87086 URINE CULTURE/COLONY COUNT: CPT | Performed by: OBSTETRICS & GYNECOLOGY

## 2022-08-30 PROCEDURE — 86762 RUBELLA ANTIBODY: CPT | Performed by: OBSTETRICS & GYNECOLOGY

## 2022-08-30 PROCEDURE — 87591 N.GONORRHOEAE DNA AMP PROB: CPT | Performed by: OBSTETRICS & GYNECOLOGY

## 2022-08-30 PROCEDURE — 36415 COLL VENOUS BLD VENIPUNCTURE: CPT | Performed by: OBSTETRICS & GYNECOLOGY

## 2022-08-30 PROCEDURE — 84439 ASSAY OF FREE THYROXINE: CPT | Performed by: OBSTETRICS & GYNECOLOGY

## 2022-08-30 PROCEDURE — 87389 HIV-1 AG W/HIV-1&-2 AB AG IA: CPT | Performed by: OBSTETRICS & GYNECOLOGY

## 2022-08-30 PROCEDURE — 82728 ASSAY OF FERRITIN: CPT | Performed by: OBSTETRICS & GYNECOLOGY

## 2022-08-30 PROCEDURE — 87491 CHLMYD TRACH DNA AMP PROBE: CPT | Performed by: OBSTETRICS & GYNECOLOGY

## 2022-08-30 PROCEDURE — 86592 SYPHILIS TEST NON-TREP QUAL: CPT | Performed by: OBSTETRICS & GYNECOLOGY

## 2022-08-31 LAB
ABO + RH BLD: NORMAL
BASOPHILS # BLD AUTO: 0.02 K/UL (ref 0–0.2)
BASOPHILS NFR BLD: 0.3 % (ref 0–1.9)
DIFFERENTIAL METHOD: ABNORMAL
EOSINOPHIL # BLD AUTO: 0 K/UL (ref 0–0.5)
EOSINOPHIL NFR BLD: 0.5 % (ref 0–8)
ERYTHROCYTE [DISTWIDTH] IN BLOOD BY AUTOMATED COUNT: 12.3 % (ref 11.5–14.5)
FERRITIN SERPL-MCNC: 72 NG/ML (ref 20–300)
HCT VFR BLD AUTO: 38.7 % (ref 37–48.5)
HGB BLD-MCNC: 12.9 G/DL (ref 12–16)
HIV 1+2 AB+HIV1 P24 AG SERPL QL IA: NORMAL
IMM GRANULOCYTES # BLD AUTO: 0.02 K/UL (ref 0–0.04)
IMM GRANULOCYTES NFR BLD AUTO: 0.3 % (ref 0–0.5)
LYMPHOCYTES # BLD AUTO: 2.3 K/UL (ref 1–4.8)
LYMPHOCYTES NFR BLD: 39.2 % (ref 18–48)
MCH RBC QN AUTO: 32.4 PG (ref 27–31)
MCHC RBC AUTO-ENTMCNC: 33.3 G/DL (ref 32–36)
MCV RBC AUTO: 97 FL (ref 82–98)
MONOCYTES # BLD AUTO: 0.4 K/UL (ref 0.3–1)
MONOCYTES NFR BLD: 7.2 % (ref 4–15)
NEUTROPHILS # BLD AUTO: 3.1 K/UL (ref 1.8–7.7)
NEUTROPHILS NFR BLD: 52.5 % (ref 38–73)
NRBC BLD-RTO: 0 /100 WBC
PLATELET # BLD AUTO: 239 K/UL (ref 150–450)
PMV BLD AUTO: 12.6 FL (ref 9.2–12.9)
RBC # BLD AUTO: 3.98 M/UL (ref 4–5.4)
T4 FREE SERPL-MCNC: 1.08 NG/DL (ref 0.71–1.51)
TSH SERPL DL<=0.005 MIU/L-ACNC: 0.38 UIU/ML (ref 0.4–4)
WBC # BLD AUTO: 5.82 K/UL (ref 3.9–12.7)

## 2022-09-01 LAB
BACTERIA UR CULT: NORMAL
C TRACH DNA SPEC QL NAA+PROBE: NOT DETECTED
N GONORRHOEA DNA SPEC QL NAA+PROBE: NOT DETECTED
RPR SER QL: NORMAL
RUBV IGG SER-ACNC: 44.5 IU/ML
RUBV IGG SER-IMP: REACTIVE

## 2023-07-26 ENCOUNTER — OFFICE VISIT (OUTPATIENT)
Dept: URGENT CARE | Facility: CLINIC | Age: 33
End: 2023-07-26
Payer: COMMERCIAL

## 2023-07-26 VITALS
HEART RATE: 71 BPM | WEIGHT: 102 LBS | DIASTOLIC BLOOD PRESSURE: 72 MMHG | TEMPERATURE: 98 F | OXYGEN SATURATION: 100 % | RESPIRATION RATE: 16 BRPM | HEIGHT: 63 IN | SYSTOLIC BLOOD PRESSURE: 124 MMHG | BODY MASS INDEX: 18.07 KG/M2

## 2023-07-26 DIAGNOSIS — G43.009 MIGRAINE WITHOUT AURA AND WITHOUT STATUS MIGRAINOSUS, NOT INTRACTABLE: ICD-10-CM

## 2023-07-26 DIAGNOSIS — J02.9 ACUTE PHARYNGITIS, UNSPECIFIED ETIOLOGY: Primary | ICD-10-CM

## 2023-07-26 LAB
CTP QC/QA: YES
MOLECULAR STREP A: NEGATIVE

## 2023-07-26 PROCEDURE — 99204 PR OFFICE/OUTPT VISIT, NEW, LEVL IV, 45-59 MIN: ICD-10-PCS | Mod: S$GLB,,, | Performed by: NURSE PRACTITIONER

## 2023-07-26 PROCEDURE — 99204 OFFICE O/P NEW MOD 45 MIN: CPT | Mod: S$GLB,,, | Performed by: NURSE PRACTITIONER

## 2023-07-26 PROCEDURE — 87651 POCT STREP A MOLECULAR: ICD-10-PCS | Mod: QW,S$GLB,, | Performed by: NURSE PRACTITIONER

## 2023-07-26 PROCEDURE — 87651 STREP A DNA AMP PROBE: CPT | Mod: QW,S$GLB,, | Performed by: NURSE PRACTITIONER

## 2023-07-26 NOTE — PATIENT INSTRUCTIONS
If you have been discharged from the clinic prior to your point of care test results being completed, please make sure to check your [facility] account.  If there is a change in treatment, we will communicate with you through here.  If your test is positive, and medications are ordered, these will be sent to your preferred pharmacy.   If your test is negative, no further steps needed. If you do not hear from us or have questions, please call the clinic.        - You must understand that you have received an Urgent Care treatment only and that you may be released before all of your medical problems are known or treated.   - You, the patient, will arrange for follow up care as instructed with your primary care provider or recommended specialist.   - If your condition worsens or fails to improve we recommend that you receive another evaluation at the ER immediately or contact your PCP to discuss your concerns, or return here.   - Please do not drive or make any important decisions for 24 hours if you have received any pain medications, sedatives or mood altering drugs during your visit.     Disclaimer: This document was drafted with the use of a voice recognition device and is likely to have sound alike errors.

## 2023-07-26 NOTE — PROGRESS NOTES
"Subjective:      Patient ID: Juanita Taylor is a 32 y.o. female.    Vitals:  height is 5' 3" (1.6 m) and weight is 46.3 kg (102 lb). Her tympanic temperature is 98.2 °F (36.8 °C). Her blood pressure is 124/72 and her pulse is 71. Her respiration is 16 and oxygen saturation is 100%.     Chief Complaint: Other Misc (Feel unwell, headaches, fatigue. Friend tested + for strep Sunday/Monday. Saw her Saturday evening. - Entered by patient)    Patient presents with fatigue, headaches, sore throat that has been off and on, but got worse this morning. Her friend tested positive for strep. She has taken OTC 500mg tylenol, Musinex, and Allegra. She is also having dizziness for about 1-2 weeks that she attributes to her birth control. She have birth recently, and is not breastfeeding. She said she will speak to her OGBYN about changing her birth control.    States she is exercising daily  States she feels "off balance and yucky"  Drinking 132 ounces daily  Wonder if this is her birth control because she has not taken this BC pill since 2020  Admits to having a migraine once in the past that she saw a Neurologist for         Sore Throat   This is a new problem. The current episode started yesterday. The problem has been gradually worsening. Neither side of throat is experiencing more pain than the other. There has been no fever. The pain is at a severity of 1/10. Associated symptoms include headaches. Pertinent negatives include no abdominal pain, congestion, coughing, diarrhea, drooling, ear discharge, ear pain, hoarse voice, plugged ear sensation, neck pain, shortness of breath, stridor, swollen glands, trouble swallowing or vomiting. She has had exposure to strep. She has had no exposure to mono. She has tried acetaminophen for the symptoms.     Constitution: Positive for fatigue. Negative for sweating and fever.   HENT:  Positive for sore throat. Negative for ear pain, ear discharge, drooling, congestion and trouble " swallowing.    Neck: Negative for neck pain.   Eyes:  Positive for photophobia.   Respiratory:  Negative for cough, shortness of breath and stridor.    Gastrointestinal:  Positive for nausea. Negative for abdominal pain, vomiting and diarrhea.   Neurological:  Positive for dizziness and headaches.    Objective:     Physical Exam   Constitutional: She appears well-developed. She is cooperative.  Non-toxic appearance. She does not appear ill. No distress. well-groomedawake  HENT:   Head: Normocephalic and atraumatic.   Ears:   Right Ear: External ear normal.   Left Ear: External ear normal.   Nose: Nose normal.   Mouth/Throat: Mucous membranes are normal. No uvula swelling. Posterior oropharyngeal erythema (very mild) present. No oropharyngeal exudate, posterior oropharyngeal edema, tonsillar abscesses or cobblestoning.   Eyes: Conjunctivae and EOM are normal.   Neck: Neck supple.   Cardiovascular: Normal rate and regular rhythm.   Pulmonary/Chest: Effort normal and breath sounds normal.   Abdominal: Normal appearance.   Musculoskeletal: Normal range of motion.         General: Normal range of motion.   Neurological: no focal deficit. She is alert. She displays no weakness. Gait normal.   Skin: Skin is warm, dry, not diaphoretic, not pale and no rash.   Psychiatric: She experiences Normal attention and Normal perception. Her speech is normal and behavior is normal. Mood, judgment and thought content normal. Cognition normal  Nursing note and vitals reviewed.    Assessment:     1. Acute pharyngitis, unspecified etiology    2. Migraine without aura and without status migrainosus, not intractable        Plan:       Acute pharyngitis, unspecified etiology  -     POCT Strep A, Molecular    Migraine without aura and without status migrainosus, not intractable           POCT Strep:  Negative   Discussed with patient she could be testing too early for Strep  OTC remedies for sore throat discussed  Obtain rest for migraine HA  and associative symptoms  Take 2 extra strength Tylenol  If symptoms do not improve after adequate steps above, follow up with PCP for possible Neuro referral  If symptoms persists or worsen, RTC, follow up with PCP, or go to the nearest ER  Patient agreed with plan of care and verbalized understanding      Patient Instructions   If you have been discharged from the clinic prior to your point of care test results being completed, please make sure to check your MyChart account.  If there is a change in treatment, we will communicate with you through here.  If your test is positive, and medications are ordered, these will be sent to your preferred pharmacy.   If your test is negative, no further steps needed. If you do not hear from us or have questions, please call the clinic.        - You must understand that you have received an Urgent Care treatment only and that you may be released before all of your medical problems are known or treated.   - You, the patient, will arrange for follow up care as instructed with your primary care provider or recommended specialist.   - If your condition worsens or fails to improve we recommend that you receive another evaluation at the ER immediately or contact your PCP to discuss your concerns, or return here.   - Please do not drive or make any important decisions for 24 hours if you have received any pain medications, sedatives or mood altering drugs during your visit.     Disclaimer: This document was drafted with the use of a voice recognition device and is likely to have sound alike errors.

## 2023-07-29 ENCOUNTER — TELEPHONE (OUTPATIENT)
Dept: URGENT CARE | Facility: CLINIC | Age: 33
End: 2023-07-29
Payer: COMMERCIAL

## 2023-08-15 ENCOUNTER — TELEPHONE (OUTPATIENT)
Dept: NEUROLOGY | Facility: CLINIC | Age: 33
End: 2023-08-15
Payer: COMMERCIAL

## 2023-08-15 NOTE — TELEPHONE ENCOUNTER
----- Message from Anna Vargas sent at 8/15/2023  3:34 PM CDT -----  Contact: Patient, [phone]  Patient is returning a phone call.  Who left a message for the patient: Chery  Does patient know what this is regarding:  Appointment  Would you like a call back, or a response through your [facility] portal?:   Call back  Comments:  Missed your call, please call her back. Thanks.

## 2023-08-15 NOTE — TELEPHONE ENCOUNTER
Spoke with patient and she has been scheduled with . Patient was also added to the waiting list and she did verbalized understanding.

## 2023-08-15 NOTE — TELEPHONE ENCOUNTER
----- Message from Rebeca Lee sent at 8/15/2023  2:41 PM CDT -----  States she would like to schedule an appt w/ Dr Staples for migraines and tingling in her LT hand. Nothing coming up on the schedule. Please call pt [phone]. Thank you

## 2023-12-20 ENCOUNTER — OFFICE VISIT (OUTPATIENT)
Dept: URGENT CARE | Facility: CLINIC | Age: 33
End: 2023-12-20
Payer: COMMERCIAL

## 2023-12-20 VITALS
OXYGEN SATURATION: 98 % | HEART RATE: 82 BPM | HEIGHT: 63 IN | TEMPERATURE: 99 F | DIASTOLIC BLOOD PRESSURE: 70 MMHG | BODY MASS INDEX: 17.36 KG/M2 | SYSTOLIC BLOOD PRESSURE: 110 MMHG | RESPIRATION RATE: 18 BRPM | WEIGHT: 98 LBS

## 2023-12-20 DIAGNOSIS — Z20.828 EXPOSURE TO THE FLU: Primary | ICD-10-CM

## 2023-12-20 LAB
CTP QC/QA: YES
POC MOLECULAR INFLUENZA A AGN: NEGATIVE
POC MOLECULAR INFLUENZA B AGN: NEGATIVE

## 2023-12-20 PROCEDURE — 99214 PR OFFICE/OUTPT VISIT, EST, LEVL IV, 30-39 MIN: ICD-10-PCS | Mod: S$GLB,,, | Performed by: PHYSICIAN ASSISTANT

## 2023-12-20 PROCEDURE — 87502 INFLUENZA DNA AMP PROBE: CPT | Mod: QW,S$GLB,, | Performed by: PHYSICIAN ASSISTANT

## 2023-12-20 PROCEDURE — 99214 OFFICE O/P EST MOD 30 MIN: CPT | Mod: S$GLB,,, | Performed by: PHYSICIAN ASSISTANT

## 2023-12-20 PROCEDURE — 87502 POCT INFLUENZA A/B MOLECULAR: ICD-10-PCS | Mod: QW,S$GLB,, | Performed by: PHYSICIAN ASSISTANT

## 2023-12-20 RX ORDER — BALOXAVIR MARBOXIL 40 MG/1
40 TABLET, FILM COATED ORAL ONCE
Qty: 1 TABLET | Refills: 0 | Status: SHIPPED | OUTPATIENT
Start: 2023-12-20 | End: 2023-12-21

## 2023-12-20 RX ORDER — BALOXAVIR MARBOXIL 40 MG/1
40 TABLET, FILM COATED ORAL ONCE
Qty: 1 TABLET | Refills: 0 | Status: SHIPPED | OUTPATIENT
Start: 2023-12-20 | End: 2023-12-20

## 2023-12-20 NOTE — PROGRESS NOTES
"Subjective:      Patient ID: Juanita Taylor is a 33 y.o. female.    Vitals:  height is 5' 3" (1.6 m) and weight is 44.5 kg (98 lb). Her oral temperature is 99 °F (37.2 °C). Her blood pressure is 110/70 and her pulse is 82. Her respiration is 18 and oxygen saturation is 98%.     Chief Complaint: Nasal Congestion    Pt states her  and daughter tested positive for flu.  Her  tested positive 5 days ago and her daughter was positive yesterday.  The pediatrician recommended patient to be tested and given xofluza prophylactically.  She is complaint and symptom-free at this time.    Other  This is a new problem. The current episode started today. The problem occurs rarely. The problem has been unchanged. Pertinent negatives include no abdominal pain, chills, congestion, coughing, fatigue, fever, headaches or weakness. Nothing aggravates the symptoms. She has tried nothing for the symptoms.     Constitution: Negative for chills, fatigue and fever.   HENT:  Negative for congestion.    Respiratory:  Negative for cough.    Gastrointestinal:  Negative for abdominal pain.   Neurological:  Negative for headaches.      Objective:     Physical Exam   Constitutional: She is oriented to person, place, and time. She appears well-developed.   HENT:   Head: Normocephalic and atraumatic.   Ears:   Right Ear: External ear normal.   Left Ear: External ear normal.   Nose: Nose normal.   Mouth/Throat: Oropharynx is clear and moist.   Eyes: Conjunctivae, EOM and lids are normal.   Neck: Trachea normal and phonation normal. Neck supple.   Cardiovascular: Normal rate and regular rhythm.   Pulmonary/Chest: Effort normal.   Musculoskeletal: Normal range of motion.         General: Normal range of motion.   Neurological: She is alert and oriented to person, place, and time.   Skin: Skin is warm, dry and intact.   Psychiatric: Her speech is normal and behavior is normal. Judgment and thought content normal.   Nursing note and vitals " reviewed.      Assessment:     1. Exposure to the flu      Results for orders placed or performed in visit on 12/20/23   POCT Influenza A/B MOLECULAR   Result Value Ref Range    POC Molecular Influenza A Ag Negative Negative, Not Reported    POC Molecular Influenza B Ag Negative Negative, Not Reported     Acceptable Yes          Plan:   VSS. Patient non-toxic appearing. Discussed medication being prescribed.  Advised patient to follow up with PCP as needed.  Patient verbalized understanding, agrees with the plan, and is comfortable with discharge.      Exposure to the flu  -     POCT Influenza A/B MOLECULAR  -     baloxavir marboxiL (XOFLUZA) 40 mg tablet; Take 1 tablet (40 mg total) by mouth once. for 1 dose  Dispense: 1 tablet; Refill: 0        Medical Decision Making:   Clinical Tests:   Lab Tests: Ordered and Reviewed       <> Summary of Lab: Flu negative

## 2024-05-21 ENCOUNTER — PATIENT MESSAGE (OUTPATIENT)
Dept: FAMILY MEDICINE | Facility: CLINIC | Age: 34
End: 2024-05-21
Payer: COMMERCIAL

## 2024-05-23 ENCOUNTER — OFFICE VISIT (OUTPATIENT)
Dept: FAMILY MEDICINE | Facility: CLINIC | Age: 34
End: 2024-05-23
Payer: COMMERCIAL

## 2024-05-23 DIAGNOSIS — R20.2 PARESTHESIAS: ICD-10-CM

## 2024-05-23 DIAGNOSIS — M79.18 MUSCULOSKELETAL PAIN: ICD-10-CM

## 2024-05-23 DIAGNOSIS — Z86.2 HISTORY OF IRON DEFICIENCY ANEMIA: ICD-10-CM

## 2024-05-23 DIAGNOSIS — Z86.39 HISTORY OF VITAMIN D DEFICIENCY: ICD-10-CM

## 2024-05-23 DIAGNOSIS — R51.9 FREQUENT HEADACHES: Primary | ICD-10-CM

## 2024-05-23 DIAGNOSIS — Z86.69 HISTORY OF MIGRAINE: ICD-10-CM

## 2024-05-23 PROCEDURE — 99999 PR PBB SHADOW E&M-EST. PATIENT-LVL III: CPT | Mod: PBBFAC,,, | Performed by: REGISTERED NURSE

## 2024-05-23 PROCEDURE — 99214 OFFICE O/P EST MOD 30 MIN: CPT | Mod: S$GLB,,, | Performed by: REGISTERED NURSE

## 2024-05-23 PROCEDURE — 3008F BODY MASS INDEX DOCD: CPT | Mod: CPTII,S$GLB,, | Performed by: REGISTERED NURSE

## 2024-05-23 PROCEDURE — 3078F DIAST BP <80 MM HG: CPT | Mod: CPTII,S$GLB,, | Performed by: REGISTERED NURSE

## 2024-05-23 PROCEDURE — 3074F SYST BP LT 130 MM HG: CPT | Mod: CPTII,S$GLB,, | Performed by: REGISTERED NURSE

## 2024-05-23 NOTE — PROGRESS NOTES
"NEW PATIENT    SUBJECTIVE:     Juanita Taylor  MRN:  [id number]  33 y.o. female    CHIEF COMPLAINT:   Migraine    HPI:    Juanita Taylor reports having a migraine headache for about 2 weeks now.  Does have h/o mgiraine with aura; no prev Neuro evaluation done.  Concerned may have some vitamin deficiency, interested in bloodwork for screening purposes.   Over time, does admit to increased stress possibly triggering headaches.  Does repor her stress level has improved some over the past month, feels "life is plateauing" now.  HA usually located to left side of head radiating to left ear/neck with some tingling in LT hand and foot.  Over the past 2 weeks, HA constant.  Tx with ibuprofen 600 mg but has not helped, most recently taking kids Motrin.  No vision problems but does have left eye pressure with watery.  Reports h/o Vit-D def and anemia.  Not on supp currently.  Does stay hydrated, eats "2 decent meals" per day, snacks in btw.  No soft drinks, occ energy drinks.  Decaf coffee 1 to 2 times per week.      REVIEW OF SYSTEMS:  Review of Systems   Constitutional:  Positive for fatigue. Negative for activity change, appetite change, chills, diaphoresis, fever and unexpected weight change.   HENT: Negative.     Eyes:  Positive for pain (more of "pressure" OS) and discharge (OS watery). Negative for photophobia, redness, itching and visual disturbance.   Respiratory: Negative.     Cardiovascular: Negative.    Gastrointestinal: Negative.    Endocrine: Negative for polydipsia, polyphagia and polyuria.   Genitourinary: Negative.    Musculoskeletal:  Positive for myalgias and neck pain. Negative for back pain, gait problem, joint swelling and neck stiffness.   Skin: Negative.    Neurological:  Positive for headaches. Negative for dizziness, tremors, syncope, facial asymmetry, weakness, light-headedness and numbness.   Psychiatric/Behavioral:  Negative for agitation, self-injury, sleep disturbance and suicidal ideas. " "The patient is nervous/anxious (reports chronic stress but improving over time).        ALLERGIES:  Review of patient's allergies indicates:   Allergen Reactions    Penicillamine      Other reaction(s): Unknown  Other reaction(s): Unknown    Penicillins      Other reaction(s): Other (See Comments)  Family members are allergic so she does not take it    Benzoyl peroxide Rash       PROBLEM LIST:  Patient Active Problem List   Diagnosis    Unspecified vitamin D deficiency    Anxiety, generalized       CURRENT MEDICATION LIST:  No current outpatient medications on file.    Current Facility-Administered Medications:     levonorgestreL (KYLEENA) 17.5 mcg/24 hrs (5 yrs) 19.5 mg IUD 17.5 mcg, 17.5 mcg, Intrauterine, , Re Crespo MD, 17.5 mcg at 08/23/23 1510      Past medical, surgical, family and social histories have been reviewed today.      OBJECTIVE:       Vitals:    05/23/24 1315 05/23/24 1405   BP: (!) 90/52 110/60 ---- VS rechecked by me after period of time/rest   Pulse: 82 68   Resp: 18    Temp: 98.4 °F (36.9 °C)    TempSrc: Tympanic    SpO2: 97%    Weight: 44.3 kg (97 lb 12.4 oz)    Height: 5' 3" (1.6 m)          Physical Exam  Vitals reviewed.   Constitutional:       General: She is not in acute distress.  HENT:      Head: Normocephalic and atraumatic.   Eyes:      Extraocular Movements: Extraocular movements intact.      Pupils: Pupils are equal, round, and reactive to light.   Neck:      Vascular: No carotid bruit.   Cardiovascular:      Rate and Rhythm: Normal rate and regular rhythm.      Pulses: Normal pulses.      Heart sounds: Normal heart sounds. No murmur heard.     No gallop.   Pulmonary:      Effort: Pulmonary effort is normal.      Breath sounds: Normal breath sounds.   Musculoskeletal:         General: No swelling, tenderness or deformity. Normal range of motion.      Cervical back: Normal range of motion and neck supple. No rigidity. No muscular tenderness.   Skin:     General: Skin " is warm and dry.      Capillary Refill: Capillary refill takes less than 2 seconds.      Findings: No rash.   Neurological:      General: No focal deficit present.      Mental Status: She is alert and oriented to person, place, and time. Mental status is at baseline.      Cranial Nerves: No cranial nerve deficit.      Sensory: No sensory deficit.      Motor: No weakness.      Coordination: Coordination normal.      Gait: Gait normal.      Deep Tendon Reflexes: Reflexes normal.   Psychiatric:         Mood and Affect: Mood normal.         Behavior: Behavior normal.         Thought Content: Thought content normal.         Judgment: Judgment normal.         ASSESSMENT:     1. Frequent headaches  -     CBC Auto Differential; Future; Expected date: 05/23/2024  -     Comprehensive Metabolic Panel; Future; Expected date: 05/23/2024  -     Ferritin; Future; Expected date: 05/23/2024  -     Iron and TIBC; Future; Expected date: 05/23/2024  -     Vitamin D; Future; Expected date: 05/23/2024  -     VITAMIN B12; Future; Expected date: 05/23/2024  -     TSH; Future; Expected date: 05/23/2024  -     Folate; Future; Expected date: 05/23/2024    2. History of migraine ---- see # 1    3. Musculoskeletal pain ----- left sided neck pain triggering headaches and LUE issues ???  -     CBC Auto Differential; Future; Expected date: 05/23/2024  -     Comprehensive Metabolic Panel; Future; Expected date: 05/23/2024  -     Ferritin; Future; Expected date: 05/23/2024  -     Iron and TIBC; Future; Expected date: 05/23/2024  -     Vitamin D; Future; Expected date: 05/23/2024  -     VITAMIN B12; Future; Expected date: 05/23/2024  -     Folate; Future; Expected date: 05/23/2024    4. Paresthesias ----- see # 3  -     CBC Auto Differential; Future; Expected date: 05/23/2024  -     Comprehensive Metabolic Panel; Future; Expected date: 05/23/2024  -     Ferritin; Future; Expected date: 05/23/2024  -     Iron and TIBC; Future; Expected date:  05/23/2024  -     Vitamin D; Future; Expected date: 05/23/2024  -     VITAMIN B12; Future; Expected date: 05/23/2024  -     TSH; Future; Expected date: 05/23/2024  -     Folate; Future; Expected date: 05/23/2024    5. History of iron deficiency anemia ---- check lab  -     CBC Auto Differential; Future; Expected date: 05/23/2024  -     Ferritin; Future; Expected date: 05/23/2024  -     Iron and TIBC; Future; Expected date: 05/23/2024    6. History of vitamin D deficiency ----- check lab  -     Vitamin D; Future; Expected date: 05/23/2024      PLAN:     Further tx and rec pending lab results.  MSK component part of headaches ???  May consider massage, acupuncture or specialist evaluation.  RTC as directed and/or prn.        SOCRATES Frank  [facility]       25 minutes of total time spent on the encounter, which includes face to face time and non-face to face time preparing to see the patient.  This includes obtaining and/or reviewing separately obtained history, performing a medically appropriate examination and/or evaluation, and counseling and educating the patient/family/caregiver.  Includes documenting clinical information in the electronic or other health record, independently interpreting results (not separately reported) and communicating results to the patient/family/caregiver, with care coordination (not separately reported).  Medications, tests and/or procedures ordered as necessary along with referring and communicating with other health professionals (when not separately reported).

## 2024-05-24 ENCOUNTER — LAB VISIT (OUTPATIENT)
Dept: LAB | Facility: HOSPITAL | Age: 34
End: 2024-05-24
Attending: REGISTERED NURSE
Payer: COMMERCIAL

## 2024-05-24 DIAGNOSIS — R51.9 FREQUENT HEADACHES: ICD-10-CM

## 2024-05-24 DIAGNOSIS — R20.2 PARESTHESIAS: ICD-10-CM

## 2024-05-24 DIAGNOSIS — M79.18 MUSCULOSKELETAL PAIN: ICD-10-CM

## 2024-05-24 DIAGNOSIS — Z86.2 HISTORY OF IRON DEFICIENCY ANEMIA: ICD-10-CM

## 2024-05-24 DIAGNOSIS — Z86.39 HISTORY OF VITAMIN D DEFICIENCY: ICD-10-CM

## 2024-05-24 LAB
25(OH)D3+25(OH)D2 SERPL-MCNC: 23 NG/ML (ref 30–96)
ALBUMIN SERPL BCP-MCNC: 4.2 G/DL (ref 3.5–5.2)
ALP SERPL-CCNC: 35 U/L (ref 55–135)
ALT SERPL W/O P-5'-P-CCNC: 13 U/L (ref 10–44)
ANION GAP SERPL CALC-SCNC: 9 MMOL/L (ref 8–16)
AST SERPL-CCNC: 17 U/L (ref 10–40)
BASOPHILS # BLD AUTO: 0.04 K/UL (ref 0–0.2)
BASOPHILS NFR BLD: 1 % (ref 0–1.9)
BILIRUB SERPL-MCNC: 0.5 MG/DL (ref 0.1–1)
BUN SERPL-MCNC: 15 MG/DL (ref 6–20)
CALCIUM SERPL-MCNC: 9.1 MG/DL (ref 8.7–10.5)
CHLORIDE SERPL-SCNC: 110 MMOL/L (ref 95–110)
CO2 SERPL-SCNC: 22 MMOL/L (ref 23–29)
CREAT SERPL-MCNC: 0.7 MG/DL (ref 0.5–1.4)
DIFFERENTIAL METHOD BLD: ABNORMAL
EOSINOPHIL # BLD AUTO: 0.1 K/UL (ref 0–0.5)
EOSINOPHIL NFR BLD: 3.4 % (ref 0–8)
ERYTHROCYTE [DISTWIDTH] IN BLOOD BY AUTOMATED COUNT: 11.9 % (ref 11.5–14.5)
EST. GFR  (NO RACE VARIABLE): >60 ML/MIN/1.73 M^2
FERRITIN SERPL-MCNC: 39 NG/ML (ref 20–300)
FOLATE SERPL-MCNC: 11.6 NG/ML (ref 4–24)
GLUCOSE SERPL-MCNC: 90 MG/DL (ref 70–110)
HCT VFR BLD AUTO: 40.7 % (ref 37–48.5)
HGB BLD-MCNC: 13.7 G/DL (ref 12–16)
IMM GRANULOCYTES # BLD AUTO: 0.01 K/UL (ref 0–0.04)
IMM GRANULOCYTES NFR BLD AUTO: 0.3 % (ref 0–0.5)
IRON SERPL-MCNC: 108 UG/DL (ref 30–160)
LYMPHOCYTES # BLD AUTO: 1.5 K/UL (ref 1–4.8)
LYMPHOCYTES NFR BLD: 39.3 % (ref 18–48)
MCH RBC QN AUTO: 33.6 PG (ref 27–31)
MCHC RBC AUTO-ENTMCNC: 33.7 G/DL (ref 32–36)
MCV RBC AUTO: 100 FL (ref 82–98)
MONOCYTES # BLD AUTO: 0.3 K/UL (ref 0.3–1)
MONOCYTES NFR BLD: 7.6 % (ref 4–15)
NEUTROPHILS # BLD AUTO: 1.9 K/UL (ref 1.8–7.7)
NEUTROPHILS NFR BLD: 48.4 % (ref 38–73)
NRBC BLD-RTO: 0 /100 WBC
PLATELET # BLD AUTO: 217 K/UL (ref 150–450)
PMV BLD AUTO: 11.3 FL (ref 9.2–12.9)
POTASSIUM SERPL-SCNC: 3.7 MMOL/L (ref 3.5–5.1)
PROT SERPL-MCNC: 6.9 G/DL (ref 6–8.4)
RBC # BLD AUTO: 4.08 M/UL (ref 4–5.4)
SATURATED IRON: 39 % (ref 20–50)
SODIUM SERPL-SCNC: 141 MMOL/L (ref 136–145)
TOTAL IRON BINDING CAPACITY: 278 UG/DL (ref 250–450)
TRANSFERRIN SERPL-MCNC: 188 MG/DL (ref 200–375)
TSH SERPL DL<=0.005 MIU/L-ACNC: 1.61 UIU/ML (ref 0.4–4)
VIT B12 SERPL-MCNC: 463 PG/ML (ref 210–950)
WBC # BLD AUTO: 3.84 K/UL (ref 3.9–12.7)

## 2024-05-24 PROCEDURE — 82746 ASSAY OF FOLIC ACID SERUM: CPT | Performed by: REGISTERED NURSE

## 2024-05-24 PROCEDURE — 82306 VITAMIN D 25 HYDROXY: CPT | Performed by: REGISTERED NURSE

## 2024-05-24 PROCEDURE — 82728 ASSAY OF FERRITIN: CPT | Performed by: REGISTERED NURSE

## 2024-05-24 PROCEDURE — 82607 VITAMIN B-12: CPT | Performed by: REGISTERED NURSE

## 2024-05-24 PROCEDURE — 36415 COLL VENOUS BLD VENIPUNCTURE: CPT | Mod: PO | Performed by: REGISTERED NURSE

## 2024-05-24 PROCEDURE — 83540 ASSAY OF IRON: CPT | Performed by: REGISTERED NURSE

## 2024-05-24 PROCEDURE — 85025 COMPLETE CBC W/AUTO DIFF WBC: CPT | Performed by: REGISTERED NURSE

## 2024-05-24 PROCEDURE — 84443 ASSAY THYROID STIM HORMONE: CPT | Performed by: REGISTERED NURSE

## 2024-05-24 PROCEDURE — 80053 COMPREHEN METABOLIC PANEL: CPT | Performed by: REGISTERED NURSE

## 2024-05-28 ENCOUNTER — PATIENT MESSAGE (OUTPATIENT)
Dept: FAMILY MEDICINE | Facility: CLINIC | Age: 34
End: 2024-05-28
Payer: COMMERCIAL

## 2024-06-03 ENCOUNTER — OFFICE VISIT (OUTPATIENT)
Dept: NEUROLOGY | Facility: CLINIC | Age: 34
End: 2024-06-03
Payer: COMMERCIAL

## 2024-06-03 VITALS
RESPIRATION RATE: 16 BRPM | WEIGHT: 97 LBS | HEART RATE: 82 BPM | BODY MASS INDEX: 17.19 KG/M2 | DIASTOLIC BLOOD PRESSURE: 83 MMHG | SYSTOLIC BLOOD PRESSURE: 121 MMHG | HEIGHT: 63 IN

## 2024-06-03 DIAGNOSIS — G44.40 ANALGESIC REBOUND HEADACHE: ICD-10-CM

## 2024-06-03 DIAGNOSIS — R20.0 ANESTHESIA OF SKIN: ICD-10-CM

## 2024-06-03 DIAGNOSIS — M54.81 OCCIPITAL NEURALGIA OF LEFT SIDE: ICD-10-CM

## 2024-06-03 DIAGNOSIS — R29.3 POOR POSTURE: ICD-10-CM

## 2024-06-03 DIAGNOSIS — T39.95XA ANALGESIC REBOUND HEADACHE: ICD-10-CM

## 2024-06-03 DIAGNOSIS — G44.89 CHRONIC MIXED HEADACHE SYNDROME: Primary | ICD-10-CM

## 2024-06-03 DIAGNOSIS — F41.1 ANXIETY, GENERALIZED: ICD-10-CM

## 2024-06-03 DIAGNOSIS — G43.711 INTRACTABLE CHRONIC MIGRAINE WITHOUT AURA AND WITH STATUS MIGRAINOSUS: ICD-10-CM

## 2024-06-03 DIAGNOSIS — G44.201 ACUTE INTRACTABLE TENSION-TYPE HEADACHE: ICD-10-CM

## 2024-06-03 DIAGNOSIS — E55.9 VITAMIN D DEFICIENCY: ICD-10-CM

## 2024-06-03 PROCEDURE — 1160F RVW MEDS BY RX/DR IN RCRD: CPT | Mod: CPTII,S$GLB,, | Performed by: NURSE PRACTITIONER

## 2024-06-03 PROCEDURE — 99999 PR PBB SHADOW E&M-EST. PATIENT-LVL V: CPT | Mod: PBBFAC,,, | Performed by: NURSE PRACTITIONER

## 2024-06-03 PROCEDURE — 99205 OFFICE O/P NEW HI 60 MIN: CPT | Mod: S$GLB,,, | Performed by: NURSE PRACTITIONER

## 2024-06-03 PROCEDURE — 3079F DIAST BP 80-89 MM HG: CPT | Mod: CPTII,S$GLB,, | Performed by: NURSE PRACTITIONER

## 2024-06-03 PROCEDURE — 1159F MED LIST DOCD IN RCRD: CPT | Mod: CPTII,S$GLB,, | Performed by: NURSE PRACTITIONER

## 2024-06-03 PROCEDURE — 99417 PROLNG OP E/M EACH 15 MIN: CPT | Mod: S$GLB,,, | Performed by: NURSE PRACTITIONER

## 2024-06-03 PROCEDURE — 3008F BODY MASS INDEX DOCD: CPT | Mod: CPTII,S$GLB,, | Performed by: NURSE PRACTITIONER

## 2024-06-03 PROCEDURE — 3074F SYST BP LT 130 MM HG: CPT | Mod: CPTII,S$GLB,, | Performed by: NURSE PRACTITIONER

## 2024-06-03 RX ORDER — PREDNISONE 50 MG/1
50 TABLET ORAL DAILY
Qty: 5 TABLET | Refills: 0 | Status: SHIPPED | OUTPATIENT
Start: 2024-06-03 | End: 2024-06-08

## 2024-06-03 RX ORDER — KETOROLAC TROMETHAMINE 10 MG/1
10 TABLET, FILM COATED ORAL ONCE
Qty: 1 TABLET | Refills: 0 | Status: SHIPPED | OUTPATIENT
Start: 2024-06-03 | End: 2024-06-03

## 2024-06-03 RX ORDER — AMITRIPTYLINE HYDROCHLORIDE 25 MG/1
25 TABLET, FILM COATED ORAL NIGHTLY PRN
Qty: 30 TABLET | Refills: 11 | Status: SHIPPED | OUTPATIENT
Start: 2024-06-03 | End: 2025-06-03

## 2024-06-03 RX ORDER — PROCHLORPERAZINE MALEATE 10 MG
10 TABLET ORAL NIGHTLY
Qty: 5 TABLET | Refills: 0 | Status: SHIPPED | OUTPATIENT
Start: 2024-06-03 | End: 2024-06-08

## 2024-06-03 NOTE — PROGRESS NOTES
Subjective:       Patient ID: Juanita Taylor is a 33 y.o. female.    Chief Complaint: Migraine (Numbness and tingling )          HPI The patient is new to me, here for evaluation of headaches. The patient reprots that she began to have headaches in her 20's while in college.  The headaches started progress in the frontal region associated with a kaleidoscope visual disturbance aura. She states the headaches went away when she discontinued birthcontrol use. She reports in June 2023 she began Junel birth control regimen and the headaches returned.  She stopped treatment and symptom resolved. The patient had an IUD placed in August 2023. She states the headache returned in Oct. 2023  but were different. The Patient reports tingling and increased pressure and throbbing to left side of head, and increased pressure behind left eye. No visual aura. Numbness and tingling and associated neurological deficits. The patient states that within the last 3 weeks she headaches have been daily with  increased pressure in left eye, no vision changes. The headaches are getting more frequent and come everyday, she began taking OTC medications nearly daily that has contributed to analgesics overuse (Rebounding headaches). The headaches are also getting more severe 6-8/10 headaches that cause significant morbidity. The headache is associated with nausea and light, sound, temperature, and smell sensitivity. The patient not vomits occasionally. Physical and emotional stressors provoke and aggravate the headache.  The headache builds up slowly towards the middle of the day or the end of the day. The headache is associated with scalp sensitivity.  The patient is unsure if  Lack of sleep is a significant trigger of the headache. Some neck pain with radiation. Poor posture. No TMJ problems.  The patient denies blurry vision and ear ringing. The headache is not positional or postural but worsens with movement and Valsalva. No history of head  trauma. No history of seizures. No history of smoking. Mild vertigo with headaches. No blacking out.  No fever, chills, or rigors. No history of significant memory loss. No history of strokes.  The patient cannot think of food that aggravates the headache. Family history is unremarkable for migraine. No family history of early dementia. Both parents have had Bell's Palsy.     Abortive therapies (tried and failed): Ibuprofen, Tylenol      Preventative therapies (tried and failed): none    Pregnancy and birth controL IUD      Review of Systems   Constitutional:  Negative for activity change.   HENT: Negative.     Respiratory: Negative.     Cardiovascular: Negative.    Gastrointestinal: Negative.    Endocrine: Negative.    Genitourinary: Negative.    Musculoskeletal:  Positive for myalgias and neck pain.   Allergic/Immunologic: Negative.    Neurological:  Positive for numbness and headaches.   Hematological: Negative.    Psychiatric/Behavioral:  Positive for decreased concentration. The patient is nervous/anxious.                  Current Outpatient Medications:     amitriptyline (ELAVIL) 25 MG tablet, Take 1 tablet (25 mg total) by mouth nightly as needed for Insomnia., Disp: 30 tablet, Rfl: 11    ketorolac (TORADOL) 10 mg tablet, Take 1 tablet (10 mg total) by mouth once. for 1 dose, Disp: 1 tablet, Rfl: 0    predniSONE (DELTASONE) 50 MG Tab, Take 1 tablet (50 mg total) by mouth once daily. for 5 days, Disp: 5 tablet, Rfl: 0    prochlorperazine (COMPAZINE) 10 MG tablet, Take 1 tablet (10 mg total) by mouth every evening. for 5 days, Disp: 5 tablet, Rfl: 0    Current Facility-Administered Medications:     levonorgestreL (KYLEENA) 17.5 mcg/24 hrs (5 yrs) 19.5 mg IUD 17.5 mcg, 17.5 mcg, Intrauterine, , Re Crespo MD, 17.5 mcg at 08/23/23 1510  Past Medical History:   Diagnosis Date    Anxiety     Vitamin D deficiency      No past surgical history on file.  Social History     Socioeconomic History     Marital status:    Tobacco Use    Smoking status: Never    Smokeless tobacco: Never   Substance and Sexual Activity    Alcohol use: No    Drug use: No    Sexual activity: Yes     Partners: Male     Birth control/protection: None     Social Determinants of Health     Financial Resource Strain: Low Risk  (1/2/2024)    Overall Financial Resource Strain (CARDIA)     Difficulty of Paying Living Expenses: Not hard at all   Food Insecurity: No Food Insecurity (1/2/2024)    Hunger Vital Sign     Worried About Running Out of Food in the Last Year: Never true     Ran Out of Food in the Last Year: Never true   Transportation Needs: No Transportation Needs (1/2/2024)    PRAPARE - Transportation     Lack of Transportation (Medical): No     Lack of Transportation (Non-Medical): No   Physical Activity: Insufficiently Active (1/2/2024)    Exercise Vital Sign     Days of Exercise per Week: 4 days     Minutes of Exercise per Session: 30 min   Stress: Stress Concern Present (1/2/2024)    [employer] [employer] - Occupational Stress Questionnaire     Feeling of Stress : To some extent   Housing Stability: Low Risk  (1/2/2024)    Housing Stability Vital Sign     Unable to Pay for Housing in the Last Year: No     Number of Places Lived in the Last Year: 1     Unstable Housing in the Last Year: No             Past/Current Medical/Surgical History, Past/Current Social History, Past/Current Family History and Past/Current Medications were reviewed in detail.        Objective:           VITAL SIGNS WERE REVIEWED      GENERAL APPEARANCE:     The patient looks comfortable.    BMI 17.18 kg     No signs of respiratory distress.    Normal breathing pattern.    No dysmorphic features    Normal eye contact.     GENERAL MEDICAL EXAM:    HEENT:  Head is atraumatic normocephalic.     No tender temporal arteries. Fundoscopic (Ophthalmoscopic) exam showed no disc edema.      Neck and Axillae: No JVD. No visible lesions.    No  carotid bruits. No thyromegaly. No lymphadenopathy.    Cardiopulmonary: No cyanosis. No tachypnea. Normal respiratory effort.    Gastrointestinal/Urogenital:  No jaundice. No stomas or lesions. No visible hernias. No catheters.     Abdomen is soft non-tender. No masses or organomegaly.    Skin, Hair and Nails: No pathognonomic skin rash. No neurofibromatosis. No visible lesions.No stigmata of autoimmune disease. No clubbing.    Skin is warm and moist. No palpable masses.    Limbs: No varicose veins. No visible swelling.    No palpable edema. Pulses are symmetric. Pedal pulses are palpable.      Muskoskeletal: No visible deformities.No visible lesions.    No spine tenderness. No signs of longstanding neuropathy. No dislocations or fractures.            Neurologic Exam     Mental Status   Oriented to person, place, and time.   Registration: recalls 3 of 3 objects. Recall at 5 minutes: recalls 3 of 3 objects. Follows 3 step commands.   Attention: normal. Concentration: normal.   Speech: speech is normal and not slurred   Level of consciousness: alert  Knowledge: good and consistent with education. Able to perform simple calculations.   Able to name object. Able to read. Able to repeat. Able to write. Normal comprehension.     Cranial Nerves     CN II   Visual fields full to confrontation.   Visual acuity: normal  Right visual field deficit: none  Left visual field deficit: none     CN III, IV, VI   Pupils are equal, round, and reactive to light.  Extraocular motions are normal.   Right pupil: Size: 2 mm. Shape: regular. Reactivity: brisk. Consensual response: intact. Accommodation: intact.   Left pupil: Size: 2 mm. Shape: regular. Reactivity: brisk. Consensual response: intact. Accommodation: intact.   CN III: no CN III palsy  CN VI: no CN VI palsy  Nystagmus: none   Diplopia: none  Ophthalmoparesis: none  Upgaze: normal  Downgaze: normal  Conjugate gaze: present  Vestibulo-ocular reflex: present    CN V   Facial  sensation intact.   Right facial sensation deficit: none  Left facial sensation deficit: none    CN VII   Right facial weakness: none  Left facial weakness: none  Left taste: normal    CN VIII   CN VIII normal.   Hearing: intact    CN IX, X   CN IX normal.   CN X normal.   Palate: symmetric    CN XI   CN XI normal.   Right sternocleidomastoid strength: normal  Left sternocleidomastoid strength: normal  Right trapezius strength: normal  Left trapezius strength: normal    CN XII   CN XII normal.   Tongue: not atrophic  Fasciculations: absent  Tongue deviation: none    Motor Exam   Muscle bulk: normal  Overall muscle tone: normal  Right arm tone: normal  Left arm tone: normal  Right arm pronator drift: absent  Left arm pronator drift: absent  Right leg tone: normal  Left leg tone: normal    Strength   Strength 5/5 throughout.   Right neck flexion: 5/5  Left neck flexion: 5/5  Right neck extension: 5/5  Left neck extension: 5/5  Right deltoid: 5/5  Left deltoid: 5/5  Right biceps: 5/5  Left biceps: 5/5  Right triceps: 5/5  Left triceps: 5/5  Right wrist flexion: 5/5  Left wrist flexion: 5/5  Right wrist extension: 5/5  Left wrist extension: 5/5  Right interossei: 5/5  Left interossei: 5/5  Right iliopsoas: 5/5  Left iliopsoas: 5/5  Right quadriceps: 5/5  Left quadriceps: 5/5  Right hamstrin/5  Left hamstrin/5  Right glutei: 5/5  Left glutei: 5/5  Right anterior tibial: 5/5  Left anterior tibial: 5/5  Right posterior tibial: 5/5  Left posterior tibial: 5/5  Right peroneal: 5/5  Left peroneal: 5/5  Right gastroc: 5/5  Left gastroc: 5/5Poor posture      Sensory Exam   Light touch normal.   Right arm light touch: normal  Left arm light touch: normal  Right leg light touch: normal  Left leg light touch: normal  Vibration normal.   Right arm vibration: normal  Left arm vibration: normal  Right leg vibration: normal  Left leg vibration: normal  Proprioception normal.   Right arm proprioception: normal  Left arm  proprioception: normal  Right leg proprioception: normal  Left leg proprioception: normal  Pinprick normal.   Right arm pinprick: normal  Left arm pinprick: normal  Right leg pinprick: normal  Left leg pinprick: normal  Graphesthesia: normal  Stereognosis: normal    Gait, Coordination, and Reflexes     Gait  Gait: normal    Coordination   Romberg: negative  Finger to nose coordination: normal  Heel to shin coordination: normal  Tandem walking coordination: normal    Tremor   Resting tremor: absent  Intention tremor: absent    Reflexes   Right brachioradialis: 2+  Left brachioradialis: 2+  Right biceps: 2+  Left biceps: 2+  Right triceps: 2+  Left triceps: 2+  Right patellar: 2+  Left patellar: 2+  Right achilles: 2+  Left achilles: 2+  Right : 2+  Left : 2+  Right plantar: normal  Left plantar: normal  Right Hagen: absent  Left Hagen: absent  Right ankle clonus: absent  Left ankle clonus: absent  Right pendular knee jerk: absent  Left pendular knee jerk: absent      Lab Results   Component Value Date    WBC 3.84 (L) 05/24/2024    HGB 13.7 05/24/2024    HCT 40.7 05/24/2024     (H) 05/24/2024     05/24/2024     Sodium   Date Value Ref Range Status   05/24/2024 141 136 - 145 mmol/L Final     Potassium   Date Value Ref Range Status   05/24/2024 3.7 3.5 - 5.1 mmol/L Final     Chloride   Date Value Ref Range Status   05/24/2024 110 95 - 110 mmol/L Final     CO2   Date Value Ref Range Status   05/24/2024 22 (L) 23 - 29 mmol/L Final     Glucose   Date Value Ref Range Status   05/24/2024 90 70 - 110 mg/dL Final     BUN   Date Value Ref Range Status   05/24/2024 15 6 - 20 mg/dL Final     Creatinine   Date Value Ref Range Status   05/24/2024 0.7 0.5 - 1.4 mg/dL Final     Calcium   Date Value Ref Range Status   05/24/2024 9.1 8.7 - 10.5 mg/dL Final     Total Protein   Date Value Ref Range Status   05/24/2024 6.9 6.0 - 8.4 g/dL Final     Albumin   Date Value Ref Range Status   05/24/2024 4.2 3.5 - 5.2  g/dL Final     Total Bilirubin   Date Value Ref Range Status   05/24/2024 0.5 0.1 - 1.0 mg/dL Final     Comment:     For infants and newborns, interpretation of results should be based  on gestational age, weight and in agreement with clinical  observations.    Premature Infant recommended reference ranges:  Up to 24 hours.............<8.0 mg/dL  Up to 48 hours............<12.0 mg/dL  3-5 days..................<15.0 mg/dL  6-29 days.................<15.0 mg/dL       Alkaline Phosphatase   Date Value Ref Range Status   05/24/2024 35 (L) 55 - 135 U/L Final     AST   Date Value Ref Range Status   05/24/2024 17 10 - 40 U/L Final     ALT   Date Value Ref Range Status   05/24/2024 13 10 - 44 U/L Final     Anion Gap   Date Value Ref Range Status   05/24/2024 9 8 - 16 mmol/L Final     Lab Results   Component Value Date    [id number] 463 05/24/2024     Lab Results   Component Value Date    TSH 1.614 05/24/2024    FREET4 1.08 08/30/2022     No results found in the last 24 hours.  No results found in the last 24 hours.      Reviewed the neuroimaging independently       Assessment:       1. Chronic mixed headache syndrome    2. Intractable chronic migraine without aura and with status migrainosus    3. Anesthesia of skin    4. Occipital neuralgia of left side    5. Analgesic rebound headache    6. Acute intractable tension-type headache    7. Poor posture    8. Anxiety, generalized    9. Unspecified vitamin D deficiency        Plan:         CHRONIC MIXED HEADACHES SYNDROME/ OCCIPITAL NEURALGIA/ CHRONIC MIGRAINE -TENSION TYPE HEADACHES W/WO AURAS/ANALGESIC REBOUNDING HEADACHES     OPHTHALMOLOGY EVALUATION    BRAIN MRI WO     C-SPINE MRI WO    Recommend PT to evaluate and treat (massage therapy and dry needling)     HEADACHE DIARY     DISCUSSED THE THREE-FOLD MANAGEMENT OF MIGRAINE:      LIFESTYLE CHANGES:       Good sleep hygiene  Avoid general triggers like lack of sleep/too much sleep, prolonged sun exposure, excessive screen  time and specific triggers based on you own diary   Minimize physical and emotional stress  Smoking avoidance and cessation  Limit caffeine drinks to 1-2 a day   Good hydration   Small frequent meals and avoid skipping meals   Moderate 30-minute-long aerobic exercises 3 times/week. Avoid strenuous exercise         ACUTE ABORTIVE     Stop excessive OTC use of medications causing rebounding headaches, do not take OTC more than 3 times per week      Give for acute HA : Prednisone 50 mg po 5 days, Toradol 10 mg po one time dose, Compazine 10 mg po HS 5 days.        LAST RESORT:     DHE NS Trudhesa (Max 2 a week)     C/I: concomitant use of vasoconstrictors like Triptans, strong CY inhibitors such as HAART PIs (eg, ritonavir, nelfinavir, or indinavir) and Macrolides (eg, erythromycin or clarithromycin), CAD, PVD, Stroke/TIA and Uncontrolled HTN.  Serious SEs include Vasospasm and Fibrosis (chronic use).       PREVENTATIVE (MORE ACCURATELY MIGRAINE REDUCTION) MEDICATIONS:           Since the patient's headache is very frequent a lengthy discussion about preventative medications was carried out.The patient understands that prevention means DECREASING frequency and severity and NOT elimination.The patient was made aware that any new medication can cause serious allergic reaction.The medication is considered failure only if a therapeutic dose reached and maintained for 6-8 weeks.      Acupuncture, massage therapy and essential oils.    HELPFUL SUPPLEMENTS:     Helpful supplements include Co-Q 10, B2, Mg, Feverfew (Dolovent combination) and butterbur (Petadolex)    Will start Amitriptyline/Elavil (TCA) slow titration to 25 mg -Age which can cause sleepiness, dry eyes, dry mouth, urinary retention, and rarely cardiac arrhythmias      NEXT OPTIONS:       Topiramate/Topamax (TPM) slow titration to 50 mg BID which can cause mental slowing, transient tingling, kidney stones, weight loss, cleft lip and palate and rarely  glaucoma and visual field defects . The patient was encouraged to drink a lot of fluids.     Amitriptyline/Elavil (TCA) slow titration to 100-Age which can cause sleepiness, dry eyes, dry mouth, urinary retention, and rarely cardiac arrhythmias    Propranolol/Inderal  (BB)slow titration to 80 mg BID which can cause low blood pressure, slow heart rate, erectile dysfunction, depression, airway obstruction and heart failure exacerbations. Cannot be used with migraine associate with focal neurological deficits.    Lamotrigine/Lamictal  (LTG)slow titration to 100 mg BID which can cause serious skin rash and rare cardiac arrhythmias. LTG is superior to other therapies for specifically reducing migraine aura.     ANTI-CGRP AGENTS: Qulipta (alogepant) 60 mg QD, Erenumab (Aimovig) 140 mg SQ Pen monthly (Reported cases of Constipation and BP elevation) , Galcanezumab (Emgality) 120 mg SQ Pen monthly after a loading dose of 240 mg  and Fremnezumab (Ajovy) (Ligand Blocker): 225 mg SQ monthly or 675 mg every 3 months     Botox 200 units every 3 months .        LAST RESORT OPTIONS:      Namenda 10 mg BID     Neuromodulation: Cefaly, Relivion     Valproic acid/ Depakote       OFF LABEL-EXPERIMENTAL     Migraine surgery.    Acupuncture, massage therapy and essential oils.      Patient will restart taking prenatal treatment     MEDICAL/SURGICAL COMORBIDITIES     All relevant medical comorbidities noted and managed by primary care physician and medical care team.          MISCELLANEOUS MEDICAL PROBLEMS       HEALTHY LIFESTYLE AND PREVENTATIVE CARE    Encouraged the patient to adhere to the age-appropriate health maintenance guidelines including screening tests and vaccinations.     Discussed the overall importance of healthy lifestyle, optimal weight, exercise, healthy diet, good sleep hygiene and avoiding drugs including smoking, alcohol and recreational drugs. The patient verbalized full understanding.       Advised the patient to  follow COVID-19 prevention measures.       I spent 120 minutes face to face with the patient    Time spent in counseling and coordination of care including discussions etiology of diagnosis, pathogenesis of diagnosis, prognosis of diagnosis,, diagnostic results, impression and recommendations, diagnostic studies, management, risks and benefits of treatment, instructions of disease self-management, treatment instructions, follow up requirements, patient and family counseling/involvement in care compliance with treatment regimen. All of the patient's questions were answered during this discussion.       Jacki Elder, MSN NP      Collaborating Provider: Adriana Staples MD, FAAN Neurologist/Epileptologist

## 2024-06-03 NOTE — PATIENT INSTRUCTIONS
HELPFUL SUPPLEMENTS:     Helpful supplements include Co-Q 10, B2, Mg, Feverfew (Dolovent combination) and butterbur (Petadolex)

## 2024-06-04 VITALS
SYSTOLIC BLOOD PRESSURE: 110 MMHG | OXYGEN SATURATION: 97 % | WEIGHT: 97.75 LBS | HEART RATE: 68 BPM | TEMPERATURE: 98 F | BODY MASS INDEX: 17.32 KG/M2 | DIASTOLIC BLOOD PRESSURE: 60 MMHG | HEIGHT: 63 IN | RESPIRATION RATE: 18 BRPM

## 2024-06-05 ENCOUNTER — HOSPITAL ENCOUNTER (OUTPATIENT)
Dept: RADIOLOGY | Facility: HOSPITAL | Age: 34
Discharge: HOME | End: 2024-06-05
Attending: NURSE PRACTITIONER
Payer: COMMERCIAL

## 2024-06-05 ENCOUNTER — NURSE TRIAGE (OUTPATIENT)
Dept: ADMINISTRATIVE | Facility: CLINIC | Age: 34
End: 2024-06-05
Payer: COMMERCIAL

## 2024-06-05 DIAGNOSIS — G43.711 INTRACTABLE CHRONIC MIGRAINE WITHOUT AURA AND WITH STATUS MIGRAINOSUS: ICD-10-CM

## 2024-06-05 DIAGNOSIS — T39.95XA ANALGESIC REBOUND HEADACHE: ICD-10-CM

## 2024-06-05 DIAGNOSIS — G44.40 ANALGESIC REBOUND HEADACHE: ICD-10-CM

## 2024-06-05 DIAGNOSIS — R20.0 ANESTHESIA OF SKIN: ICD-10-CM

## 2024-06-05 DIAGNOSIS — M54.81 OCCIPITAL NEURALGIA OF LEFT SIDE: ICD-10-CM

## 2024-06-05 PROCEDURE — 72141 MRI NECK SPINE W/O DYE: CPT | Mod: TC,PN

## 2024-06-05 PROCEDURE — 72141 MRI NECK SPINE W/O DYE: CPT | Mod: 26,,, | Performed by: STUDENT IN AN ORGANIZED HEALTH CARE EDUCATION/TRAINING PROGRAM

## 2024-06-05 PROCEDURE — 70551 MRI BRAIN STEM W/O DYE: CPT | Mod: TC,PN

## 2024-06-05 PROCEDURE — 70551 MRI BRAIN STEM W/O DYE: CPT | Mod: 26,,, | Performed by: STUDENT IN AN ORGANIZED HEALTH CARE EDUCATION/TRAINING PROGRAM

## 2024-06-05 NOTE — TELEPHONE ENCOUNTER
Caller states that the new medication that she is taking is causing her an hot feeling at night x 2 days since starting medications. Caller has questions about side effects of medication.  Caller advised to speak to pharmacy staff and provider about medication alternatives.  Pt advised per protocol and verbalized understanding.     Reason for Disposition   [1] Caller has medicine question about med NOT prescribed by PCP AND [2] triager unable to answer question (e.g., compatibility with other med, storage)    Additional Information   Negative: [1] Intentional drug overdose AND [2] suicidal thoughts or ideas   Negative: MORE THAN A DOUBLE DOSE of a prescription or over-the-counter (OTC) drug   Negative: [1] DOUBLE DOSE (an extra dose or lesser amount) of prescription drug AND [2] any symptoms (e.g., dizziness, nausea, pain, sleepiness)   Negative: [1] DOUBLE DOSE (an extra dose or lesser amount) of over-the-counter (OTC) drug AND [2] any symptoms (e.g., dizziness, nausea, pain, sleepiness)   Negative: Took another person's prescription drug   Negative: [1] DOUBLE DOSE (an extra dose or lesser amount) of prescription drug AND [2] NO symptoms  (Exception: A double dose of antibiotics.)   Negative: Diabetes drug error or overdose (e.g., took wrong type of insulin or took extra dose)   Negative: [1] Prescription not at pharmacy AND [2] was prescribed by PCP recently (Exception: Triager has access to EMR and prescription is recorded there. Go to Home Care and confirm for pharmacy.)   Negative: [1] Pharmacy calling with prescription question AND [2] triager unable to answer question   Negative: [1] Caller has URGENT medicine question about med that PCP or specialist prescribed AND [2] triager unable to answer question   Negative: Medicine patch causing local rash or itching    Protocols used: Medication Question Call-A-

## 2024-06-07 ENCOUNTER — TELEPHONE (OUTPATIENT)
Dept: NEUROLOGY | Facility: CLINIC | Age: 34
End: 2024-06-07
Payer: COMMERCIAL

## 2024-06-07 NOTE — TELEPHONE ENCOUNTER
Pt called in about the  compazine giving her issues advised her per Tracie to stop the medication . She verbalized  understanding .

## 2024-06-07 NOTE — TELEPHONE ENCOUNTER
----- Message from Katheryn Plascencia sent at 6/7/2024 12:32 PM CDT -----  Contact: self  ..Type:  Patient Returning Call    Who Called:.Juanita Taylor  Who Left Message for Patient:Regina   Does the patient know what this is regarding?:  Would the patient rather a call back or a response via SMS [facility]? My [facility]   Best Call Back Number:.[phone] ([place])  Additional Information:

## 2024-06-14 ENCOUNTER — CLINICAL SUPPORT (OUTPATIENT)
Dept: REHABILITATION | Facility: HOSPITAL | Age: 34
End: 2024-06-14
Payer: COMMERCIAL

## 2024-06-14 DIAGNOSIS — T39.95XA ANALGESIC REBOUND HEADACHE: ICD-10-CM

## 2024-06-14 DIAGNOSIS — R53.1 DECREASED STRENGTH, ENDURANCE, AND MOBILITY: ICD-10-CM

## 2024-06-14 DIAGNOSIS — G44.40 ANALGESIC REBOUND HEADACHE: ICD-10-CM

## 2024-06-14 DIAGNOSIS — G43.711 INTRACTABLE CHRONIC MIGRAINE WITHOUT AURA AND WITH STATUS MIGRAINOSUS: ICD-10-CM

## 2024-06-14 DIAGNOSIS — Z74.09 DECREASED STRENGTH, ENDURANCE, AND MOBILITY: ICD-10-CM

## 2024-06-14 DIAGNOSIS — M54.81 OCCIPITAL NEURALGIA OF LEFT SIDE: ICD-10-CM

## 2024-06-14 DIAGNOSIS — M54.2 NECK PAIN: Primary | ICD-10-CM

## 2024-06-14 DIAGNOSIS — R68.89 DECREASED STRENGTH, ENDURANCE, AND MOBILITY: ICD-10-CM

## 2024-06-14 DIAGNOSIS — R29.898 DECREASED RANGE OF MOTION OF NECK: ICD-10-CM

## 2024-06-14 PROCEDURE — 97162 PT EVAL MOD COMPLEX 30 MIN: CPT | Performed by: PHYSICAL THERAPIST

## 2024-06-14 PROCEDURE — 97110 THERAPEUTIC EXERCISES: CPT | Performed by: PHYSICAL THERAPIST

## 2024-06-14 NOTE — PLAN OF CARE
"[facility]   Physical Therapy Initial Evaluation      Date: 6/14/2024   Name: Juanita Taylor  Clinic Number: [id number]    Therapy Diagnosis:    Encounter Diagnoses   Name Primary?    Neck pain Yes    Decreased range of motion of neck     Decreased strength, endurance, and mobility     Occipital neuralgia of left side     Intractable chronic migraine without aura and with status migrainosus     Analgesic rebound headache       Physician: Tracie Elder NP     Physician Orders: PT Eval and Treat  Medical Diagnosis from Referral: intractable chronic migraine without aura and with status migrainosus, occipital neuralgia of left side, analgesic rebound headache  Evaluation Date: 6/14/2024  Authorization Period Expiration: 6/3/2025  Plan of Care Expiration: 9/12/2024  Progress Note Due: 7/14/2024  Visit # / Visits authorized: 1/1   FOTO: 1/3 (last performed on 6/14/2024)    Precautions: Standard    Time In: 1307  Time Out: 1405  Total Billable Time (timed & untimed codes): 55 minutes    Subjective     Date of onset: approximately one month ago    History of current condition - Juanita reports that about a month ago she really started to notice headaches. She has had headaches here and there before, but this was different. The new headaches are always on the left side of her head. The aching was in the left side of her head, and then she started to get numbness in her cheek bone and pressure behind her eye. In the fall she started to notice tinging in her left forearm, but it resolved once she started back taking her magnesium. Today she got her hair done, and she could tell her neck was getting stiff, and she had a shooting pain into the left side of her neck. Patient took a prednisone pack last week, and it has seemed to help. The pain in her head has not occurred over the past week, and she doesn't feel as "foggy". She is fearful of doing anything that could make the headache return. "     Falls: none    Imaging: [] Xray [x] MRI [] CT: Performed on: 6/3/2024    Pain:  Current 2/10, worst 8/10, best 0/10   Location: [] Right   [x] Left:  neck  Description: aching  and pressure  Aggravating Factors: picking up her two young babies, neck extension (such as getting her hair done today)  Easing Factors: activity avoidance, rest    Prior Therapy:   [] N/A    [x] Yes: pelvic floor   Social History: Pt lives with their family  Occupation: Pt is a healthy  for [employer]   Prior Level of Function: Independent and pain free with all ADL, IADL, community mobility and functional activities.   Current Level of Function: Independent with all ADL, IADL, community mobility and functional activities with reports of increased pain and need for increased time and frequent breaks.      Dominant Extremity:    [x] Right    [] Left    Pts goals: Pt reported goals are to decrease overall pain levels in order to return to prior functional level.     Medical History:   Past Medical History:   Diagnosis Date    Anxiety     Vitamin D deficiency        Surgical History:   Juanita Taylor  has no past surgical history on file.    Medications:   Juanita has a current medication list which includes the following prescription(s): amitriptyline and sulfamethoxazole-trimethoprim 800-160mg, and the following Facility-Administered Medications: levonorgestrel.    Allergies:   Review of patient's allergies indicates:   Allergen Reactions    Penicillamine      Other reaction(s): Unknown  Other reaction(s): Unknown    Penicillins      Other reaction(s): Other (See Comments)  Family members are allergic so she does not take it    Benzoyl peroxide Rash        Objective        RANGE OF MOTION:   Cervical Right   (spine) Left    Pain/Dysfunction with Movement Goal   Cervical Flexion (60º) 40 ---  60   Cervical Extension (80º) 56 --- Minimal pain at top of the neck 80   Cervical Side Bending (45º) 45 45  45   Cervical  Rotation (75º) 80 80  75       STRENGTH:   U/E MMT Right Left Pain/Dysfunction with Movement Goal   Shoulder Flexion 4/5 4/5 No pain with MMT's 4+/5 B   Shoulder Abduction 4/5 4/5  4+/5 B   Shoulder IR 4+/5 4+/5  4+/5 B   Shoulder ER 4/5 4/5  4+/5 B   Serratus Anterior NT NT  4+/5 B   Middle Trapezius 3+/5 3+/5  4+/5 B   Lower Trapezius 3+/5 3+/5  4+/5 B   Elbow Flexion  5/5 5/5  5/5 B   Elbow Extension 5/5 5/5  5/5 B   Wrist Flexion 5/5 5/5  5/5 B   Wrist Extension 5/5 5/5  5/5 B        MUSCLE LENGTH:   Muscle Tested  Right Left  Goal   Upper Trapezius [] Normal  [x] Limited [] Normal  [x] Limited Normal B   Levator Scapular  [] Normal  [x] Limited [] Normal  [x] Limited Normal B   Scalenes [] Normal  [x] Limited [] Normal  [x] Limited Normal B   Pectoralis Minor [] Normal  [x] Limited [] Normal  [x] Limited Normal B   Pectoralis Major [] Normal  [x] Limited [] Normal  [x] Limited Normal B       JOINT MOBILITY:   Joint Motion Right Mobility  (spine) Left Mobility Goal   Subcranial:  [] Hypo     [] Normal     [x] Hyper [] Hypo     [] Normal     [x] Hyper Normal    Cervical Upglides [] Hypo     [] Normal     [x] Hyper [] Hypo     [] Normal     [x] Hyper Normal    Cervical Downglides  [] Hypo     [] Normal     [x] Hyper [] Hypo     [] Normal     [x] Hyper Normal    Thoracic PA Gap [] Hypo     [x] Normal     [] Hyper --- Normal        SPECIAL TESTS:    Right  (spine) Left  Goal   Cervical Distraction [] Positive    [x] Negative [] Positive    [x] Negative Negative B    Cervical Compression  [] Positive    [x] Negative [] Positive    [x] Negative Negative B         SENSATION  [x] Intact to Light Touch   [] Impaired:      PALPATION: Muscles: Increased tone and tenderness to palpation of: left suboccipitals, paraspinals, upper trapezius, levator scapulae , periscapular musculature, pectorals.     POSTURE:  Pt presents with postural abnormalities which include:    [x] Forward Head   [] Increased Lumbar Lordosis   [x]  Rounded Shoulder   [] Genu Recurvatum   [] Increased Thoracic Kyphosis [] Genu Valgus   [] Trunk Deviated    [] Pes Planus   [] Scapular Winging    [] Other:         Function:     Intake Outcome Measure for FOTO Neck Survey    Therapist reviewed FOTO scores for Juanita on 6/14/2024.   FOTO report - see Media section or FOTO account for episode details    Intake Score: 61%         Treatment     Total Treatment time (time-based codes) separate from Evaluation: (10) minutes     Juanita received the treatments listed below:        THERAPEUTIC EXERCISES to develop strength, endurance, ROM, flexibility, posture, and core stabilization for (10) minutes including:    Intervention Performed Today    Patient Education and HEP  Patient Education on diagnosis, PT treatment and how exercises can help with s/s. Discussed focus on postural stability and endurance.  See Patient Instructions for details on HEP.                                        Plan for Next Visit:        Patient Education and Home Exercises     Education provided: (included in treatment section) minutes  PURPOSE: Patient educated on the impairments noted above and the effects of physical therapy intervention to improve overall condition and QOL.   EXERCISE: Patient was educated on all the above exercise prior/during/after for proper posture, positioning, and execution for safe performance with home exercise program.   STRENGTH: Patient educated on the importance of improved core and extremity strength in order to improve alignment of the spine and extremities with static positions and dynamic movement.   POSTURE: Patient educated on postural awareness to reduce stress and maintain optimal alignment of the spine with static positions and dynamic movement     Written Home Exercises Provided: yes.  Exercises were reviewed and Juanita was able to demonstrate them prior to the end of the session.  Juanita demonstrated good  understanding of the education provided. See  "EMR under Patient Instructions for exercises provided during therapy sessions.    Assessment     Juanita is a 33 y.o. female referred to outpatient Physical Therapy with a medical diagnosis of intractable chronic migraine without aura and with status migrainosus, occipital neuralgia of left side, analgesic rebound headache. Pt presents with impairments in the following categories: IMPAIRMENTS: ROM, strength, endurance, joint mobility, muscle length, posture, core strength and stability, and functional movement patterns    Pt prognosis is Good  Pt will benefit from skilled outpatient Physical Therapy to address the deficits stated above and in the chart below, provide pt/family education, and to maximize pt's level of independence.     Plan of care discussed with patient: Yes  Pt's spiritual, cultural and educational needs considered and patient is agreeable to the plan of care and goals as stated below:     Anticipated Barriers for therapy: none    Medical Necessity is demonstrated by the following  History  Co-morbidities and personal factors that may impact the plan of care [] LOW: no personal factors / co-morbidities  [x] MODERATE: 1-2 personal factors / co-morbidities  [] HIGH: 3+ personal factors / co-morbidities    Moderate / High Support Documentation:   Past Medical History:   Diagnosis Date    Anxiety     Vitamin D deficiency         Examination  Body Structures and Functions, activity limitations and participation restrictions that may impact the plan of care [] LOW: addressing 1-2 elements  [x] MODERATE: 3+ elements  [] HIGH: 4+ elements (please support below)    Moderate / High Support Documentation: See above in "Current Level of Function"      Clinical Presentation [] LOW: stable  [x] MODERATE: Evolving  [] HIGH: Unstable     Decision Making/ Complexity Score: moderate         Short Term Goals:  6 weeks Status  Date Met   PAIN: Pt will report worst pain of 6/10 in order to progress toward max functional " ability and improve quality of life. [x] Progressing  [] Met  [] Not Met    FUNCTION: Patient will demonstrate improved function as indicated by a score of greater than or equal to 67 out of 100 on FOTO. [x] Progressing  [] Met  [] Not Met    STRENGTH: Patient will improve strength to 50% of stated goals, listed in objective measures above, in order to progress towards independence with functional activities. [x] Progressing  [] Met  [] Not Met    POSTURE: Patient will correct postural deviations in sitting and standing, to decrease pain and promote long term stability.  [x] Progressing  [] Met  [] Not Met    HEP: Patient will demonstrate independence with HEP in order to progress toward functional independence. [x] Progressing  [] Met  [] Not Met      Long Term Goals:  12 weeks Status Date Met   PAIN: Pt will report worst pain of 4/10 in order to progress toward max functional ability and improve quality of life [x] Progressing  [] Met  [] Not Met    FUNCTION: Patient will demonstrate improved function as indicated by a score of greater than or equal to 74 out of 100 on FOTO. [x] Progressing  [] Met  [] Not Met    MOBILITY: Patient will improve AROM to stated goals, listed in objective measures above, in order to return to maximal functional potential and improve quality of life.  [x] Progressing  [] Met  [] Not Met    STRENGTH: Patient will improve strength to stated goals, listed in objective measures above, in order to improve functional independence and quality of life.  [x] Progressing  [] Met  [] Not Met    Patient will return to normal ADL's, IADL's, community involvement, recreational activities, and work-related activities with less than or equal to 2/10 pain and maximal function.  [x] Progressing  [] Met  [] Not Met      Plan     Plan of care Certification: 6/14/2024 to 9/12/2024.    Outpatient Physical Therapy 2 times weekly for 12 weeks to include any combination of the following interventions: virtual  visits, dry needling, modalities, electrical stimulation (IFC, Pre-Mod, Attended with Functional Dry Needling), Aquatic Therapy, Cervical/Lumbar Traction, Gait Training, Manual Therapy, Neuromuscular Re-ed, Patient Education, Self Care, Therapeutic Exercise, and Therapeutic Activites     Natasha Herrera, PT, DPT

## 2024-06-17 ENCOUNTER — CLINICAL SUPPORT (OUTPATIENT)
Dept: REHABILITATION | Facility: HOSPITAL | Age: 34
End: 2024-06-17
Payer: COMMERCIAL

## 2024-06-17 DIAGNOSIS — M54.81 OCCIPITAL NEURALGIA OF LEFT SIDE: ICD-10-CM

## 2024-06-17 DIAGNOSIS — R53.1 DECREASED STRENGTH, ENDURANCE, AND MOBILITY: ICD-10-CM

## 2024-06-17 DIAGNOSIS — Z74.09 DECREASED STRENGTH, ENDURANCE, AND MOBILITY: ICD-10-CM

## 2024-06-17 DIAGNOSIS — R29.898 DECREASED RANGE OF MOTION OF NECK: ICD-10-CM

## 2024-06-17 DIAGNOSIS — M54.2 NECK PAIN: ICD-10-CM

## 2024-06-17 DIAGNOSIS — G43.711 INTRACTABLE CHRONIC MIGRAINE WITHOUT AURA AND WITH STATUS MIGRAINOSUS: Primary | ICD-10-CM

## 2024-06-17 DIAGNOSIS — R68.89 DECREASED STRENGTH, ENDURANCE, AND MOBILITY: ICD-10-CM

## 2024-06-17 PROCEDURE — 97530 THERAPEUTIC ACTIVITIES: CPT | Performed by: PHYSICAL THERAPIST

## 2024-06-17 PROCEDURE — 97140 MANUAL THERAPY 1/> REGIONS: CPT | Performed by: PHYSICAL THERAPIST

## 2024-06-17 PROCEDURE — 97112 NEUROMUSCULAR REEDUCATION: CPT | Performed by: PHYSICAL THERAPIST

## 2024-06-17 NOTE — PROGRESS NOTES
"[facility]   Physical Therapy Treatment Note        Name: Juanita Taylor  Clinic Number: [id number]    Therapy Diagnosis:   Encounter Diagnoses   Name Primary?    Intractable chronic migraine without aura and with status migrainosus Yes    Occipital neuralgia of left side     Neck pain     Decreased range of motion of neck     Decreased strength, endurance, and mobility      Physician: Tracie Elder NP    Visit Date: 6/17/2024    Physician Orders: PT Eval and Treat  Medical Diagnosis from Referral: intractable chronic migraine without aura and with status migrainosus, occipital neuralgia of left side, analgesic rebound headache  Evaluation Date: 6/14/2024  Authorization Period Expiration: 12/31/2024  Plan of Care Expiration: 9/12/2024  Progress Note Due: 7/14/2024  Visit # / Visits authorized: 1/12 (+eval)   FOTO: 1/3 (last performed on 6/14/2024)     Precautions: Standard    PTA Visit #: 0/5     Time In: 1408  Time Out: 1525  Total Billable Time: 70 minutes (Billing reflects 1 on 1 treatment time spent with patient)    Subjective     Patient reports: feeling okay today. She had a brief episode of tingling into her left hand today, but it only lasted for a few seconds. She is not sure why it occurred.     He/She was compliant with home exercise program.  Response to previous treatment: no adverse reaction  Functional change: none noted yet    Pain: 0/10     Location: left side of neck    Objective      Objective Measures updated at progress report or POC update only unless otherwise noted.       Treatment     Juanita received the treatments listed below:     CPT Intervention Duration / Intensity Performed   MT Soft Tissue Mobilization, suboccipital release Bilateral (but focus on L), upper trapezius, levator scapula, cervical paraspinals, and suboccipitals x   TE                             NMR UBE for postural strength and endurance  3'/3' x   NMR Cervical isometrics 10x, 5" holds x " "  NMR Chin Tucks - supine 2 x 10, 3" holds x   NMR Series 6 1' each, 2' pec stretch x             TA Scapular Retractions  3 x 10, red theraband x   TA Shoulder Extensions 3 x 10, red theraband x   TA Bilateral External rotation 3 x 10, red theraband x   TA Prone Row 2 x 10, B UE x                                           PLAN               CPT Codes available for Billing:   (15) minutes of Manual therapy (MT) to improve pain and ROM.  (00) minutes of Therapeutic Exercise (TE) to develop strength, endurance, range of motion, and flexibility.  (26) minutes of Neuromuscular Re-Education (NMR)  to improve: Balance, Coordination, Kinesthetic, Sense, Proprioception, and Posture.  (24) minutes of Therapeutic Activities (TA) to improve functional performance.  Unattended Electrical Stimulation (ES) for muscle performance or pain modulation.  BFR: Blood flow restriction applied during exercise  NP or (-): Not Performed    Patient Education and Home Exercises       Home Exercises Provided and Patient Education Provided     Education provided: (included in treatment section) minutes  PURPOSE: Patient educated on the impairments noted above and the effects of physical therapy intervention to improve overall condition and QOL.   EXERCISE: Patient was educated on all the above exercise prior/during/after for proper posture, positioning, and execution for safe performance with home exercise program.   STRENGTH: Patient educated on the importance of improved core and extremity strength in order to improve alignment of the spine and extremities with static positions and dynamic movement.   POSTURE: Patient educated on postural awareness to reduce stress and maintain optimal alignment of the spine with static positions and dynamic movement     Written Home Exercises Provided: yes.  Exercises were reviewed and Juanita was able to demonstrate them prior to the end of the session.  Juanita demonstrated good  understanding of the " education provided. See EMR under Patient Instructions for exercises provided during therapy sessions.    Assessment     Patient tolerated treatment well. She completed exercises with only minimal difficulty and complaint. Only minimal cues required for proper form today. Tenderness still reported upon palpation of left upper cervical paraspinals, which did improve with manual therapy. Treatment today focused on postural strengthening and stabilization.     Juanita is progressing well towards her goals.   Patient prognosis is Good.     Patient will continue to benefit from skilled outpatient physical therapy to address the deficits listed in the problem list box on initial evaluation, provide pt/family education and to maximize patient's level of independence in the home and community environment.     Patient's spiritual, cultural and educational needs considered and pt agreeable to plan of care and goals.     Anticipated Barriers for therapy: none     Goals:  Short Term Goals:  6 weeks Status  Date Met   PAIN: Pt will report worst pain of 6/10 in order to progress toward max functional ability and improve quality of life. [x] Progressing  [] Met  [] Not Met     FUNCTION: Patient will demonstrate improved function as indicated by a score of greater than or equal to 67 out of 100 on FOTO. [x] Progressing  [] Met  [] Not Met     STRENGTH: Patient will improve strength to 50% of stated goals, listed in objective measures above, in order to progress towards independence with functional activities. [x] Progressing  [] Met  [] Not Met     POSTURE: Patient will correct postural deviations in sitting and standing, to decrease pain and promote long term stability.  [x] Progressing  [] Met  [] Not Met     HEP: Patient will demonstrate independence with HEP in order to progress toward functional independence. [x] Progressing  [] Met  [] Not Met        Long Term Goals:  12 weeks Status Date Met   PAIN: Pt will report worst pain  of 4/10 in order to progress toward max functional ability and improve quality of life [x] Progressing  [] Met  [] Not Met     FUNCTION: Patient will demonstrate improved function as indicated by a score of greater than or equal to 74 out of 100 on FOTO. [x] Progressing  [] Met  [] Not Met     MOBILITY: Patient will improve AROM to stated goals, listed in objective measures above, in order to return to maximal functional potential and improve quality of life.  [x] Progressing  [] Met  [] Not Met     STRENGTH: Patient will improve strength to stated goals, listed in objective measures above, in order to improve functional independence and quality of life.  [x] Progressing  [] Met  [] Not Met     Patient will return to normal ADL's, IADL's, community involvement, recreational activities, and work-related activities with less than or equal to 2/10 pain and maximal function.  [x] Progressing  [] Met  [] Not Met          Plan     Continue Plan of Care (POC) and progress per patient tolerance. See treatment section for details on planned progressions next session.    6/14/2024 (eval): Outpatient Physical Therapy 2 times weekly for 12 weeks to include any combination of the following interventions: virtual visits, dry needling, modalities, electrical stimulation (IFC, Pre-Mod, Attended with Functional Dry Needling), Aquatic Therapy, Cervical/Lumbar Traction, Gait Training, Manual Therapy, Neuromuscular Re-ed, Patient Education, Self Care, Therapeutic Exercise, and Therapeutic Activites     Natasha Herrera, PT

## 2024-06-28 ENCOUNTER — TELEPHONE (OUTPATIENT)
Dept: REHABILITATION | Facility: HOSPITAL | Age: 34
End: 2024-06-28
Payer: COMMERCIAL

## 2024-07-01 ENCOUNTER — CLINICAL SUPPORT (OUTPATIENT)
Dept: REHABILITATION | Facility: HOSPITAL | Age: 34
End: 2024-07-01
Payer: COMMERCIAL

## 2024-07-01 DIAGNOSIS — Z74.09 DECREASED STRENGTH, ENDURANCE, AND MOBILITY: ICD-10-CM

## 2024-07-01 DIAGNOSIS — R53.1 DECREASED STRENGTH, ENDURANCE, AND MOBILITY: ICD-10-CM

## 2024-07-01 DIAGNOSIS — M54.81 OCCIPITAL NEURALGIA OF LEFT SIDE: ICD-10-CM

## 2024-07-01 DIAGNOSIS — M54.2 NECK PAIN: ICD-10-CM

## 2024-07-01 DIAGNOSIS — G43.711 INTRACTABLE CHRONIC MIGRAINE WITHOUT AURA AND WITH STATUS MIGRAINOSUS: Primary | ICD-10-CM

## 2024-07-01 DIAGNOSIS — R29.898 DECREASED RANGE OF MOTION OF NECK: ICD-10-CM

## 2024-07-01 DIAGNOSIS — R68.89 DECREASED STRENGTH, ENDURANCE, AND MOBILITY: ICD-10-CM

## 2024-07-01 PROCEDURE — 97140 MANUAL THERAPY 1/> REGIONS: CPT | Performed by: PHYSICAL THERAPIST

## 2024-07-01 PROCEDURE — 97530 THERAPEUTIC ACTIVITIES: CPT | Performed by: PHYSICAL THERAPIST

## 2024-07-01 PROCEDURE — 97112 NEUROMUSCULAR REEDUCATION: CPT | Performed by: PHYSICAL THERAPIST

## 2024-07-01 NOTE — PROGRESS NOTES
[facility]   Physical Therapy Treatment Note        Name: Juanita Taylor  Clinic Number: [id number]    Therapy Diagnosis:   Encounter Diagnoses   Name Primary?    Intractable chronic migraine without aura and with status migrainosus Yes    Occipital neuralgia of left side     Neck pain     Decreased range of motion of neck     Decreased strength, endurance, and mobility      Physician: Tracie Elder NP    Visit Date: 7/1/2024    Physician Orders: PT Eval and Treat  Medical Diagnosis from Referral: intractable chronic migraine without aura and with status migrainosus, occipital neuralgia of left side, analgesic rebound headache  Evaluation Date: 6/14/2024  Authorization Period Expiration: 12/31/2024  Plan of Care Expiration: 9/12/2024  Progress Note Due: 7/14/2024  Visit # / Visits authorized: 2/12 (+eval)   FOTO: 1/3 (last performed on 6/14/2024)     Precautions: Standard    PTA Visit #: 0/5     Time In: 0758  Time Out: 0900  Total Billable Time: 60 minutes (Billing reflects 1 on 1 treatment time spent with patient)    Subjective     Patient reports: that she has been doing good. She felt good following last visit and could tell it helped her to feel looser. She can now tell when her neck is going to start bothering her, such as if she is lifting or holding the kids a lot.     He/She was compliant with home exercise program.  Response to previous treatment: no adverse reaction  Functional change: none noted yet    Pain: 0/10     Location: left side of neck    Objective      Objective Measures updated at progress report or POC update only unless otherwise noted.       Treatment     Juanita received the treatments listed below:     CPT Intervention Duration / Intensity Performed   MT Soft Tissue Mobilization, suboccipital release Bilateral (but focus on L), upper trapezius, levator scapula, cervical paraspinals, and suboccipitals x   NMR UBE for postural strength and endurance  3'/3' x  "  NMR Cervical isometrics 10x, 5" holds x   NMR Chin Tucks - supine 2 x 10, 3" holds x   NMR Series 6 1' each, 2' pec stretch x             TA Scapular Retractions  3 x 10, green theraband x   TA Shoulder Extensions 3 x 10, green theraband x   TA Bilateral External rotation 3 x 10, red theraband x   TA Prone Row 2 x 10, B UE x   PLAN               CPT Codes available for Billing:   (15) minutes of Manual therapy (MT) to improve pain and ROM.  (00) minutes of Therapeutic Exercise (TE) to develop strength, endurance, range of motion, and flexibility.  (26) minutes of Neuromuscular Re-Education (NMR)  to improve: Balance, Coordination, Kinesthetic, Sense, Proprioception, and Posture.  (19) minutes of Therapeutic Activities (TA) to improve functional performance.  Unattended Electrical Stimulation (ES) for muscle performance or pain modulation.  BFR: Blood flow restriction applied during exercise  NP or (-): Not Performed    Patient Education and Home Exercises       Home Exercises Provided and Patient Education Provided     Education provided: (included in treatment section) minutes  PURPOSE: Patient educated on the impairments noted above and the effects of physical therapy intervention to improve overall condition and QOL.   EXERCISE: Patient was educated on all the above exercise prior/during/after for proper posture, positioning, and execution for safe performance with home exercise program.   STRENGTH: Patient educated on the importance of improved core and extremity strength in order to improve alignment of the spine and extremities with static positions and dynamic movement.   POSTURE: Patient educated on postural awareness to reduce stress and maintain optimal alignment of the spine with static positions and dynamic movement     Written Home Exercises Provided: yes.  Exercises were reviewed and Juanita was able to demonstrate them prior to the end of the session.  Juanita demonstrated good  understanding of the " education provided. See EMR under Patient Instructions for exercises provided during therapy sessions.    Assessment     Patient did well with treatment today. She completed exercises with less difficulty and without increased complaint. Only minimal cues required throughout treatment to maintain proper form with exercises. Patient demonstrates improving postural strength and stabilization and is appropriate to progress again next visit. Good relief note with manual therapy today.     Juanita is progressing well towards her goals.   Patient prognosis is Good.     Patient will continue to benefit from skilled outpatient physical therapy to address the deficits listed in the problem list box on initial evaluation, provide pt/family education and to maximize patient's level of independence in the home and community environment.     Patient's spiritual, cultural and educational needs considered and pt agreeable to plan of care and goals.     Anticipated Barriers for therapy: none     Goals:  Short Term Goals:  6 weeks Status  Date Met   PAIN: Pt will report worst pain of 6/10 in order to progress toward max functional ability and improve quality of life. [x] Progressing  [] Met  [] Not Met     FUNCTION: Patient will demonstrate improved function as indicated by a score of greater than or equal to 67 out of 100 on FOTO. [x] Progressing  [] Met  [] Not Met     STRENGTH: Patient will improve strength to 50% of stated goals, listed in objective measures above, in order to progress towards independence with functional activities. [x] Progressing  [] Met  [] Not Met     POSTURE: Patient will correct postural deviations in sitting and standing, to decrease pain and promote long term stability.  [x] Progressing  [] Met  [] Not Met     HEP: Patient will demonstrate independence with HEP in order to progress toward functional independence. [x] Progressing  [] Met  [] Not Met        Long Term Goals:  12 weeks Status Date Met    PAIN: Pt will report worst pain of 4/10 in order to progress toward max functional ability and improve quality of life [x] Progressing  [] Met  [] Not Met     FUNCTION: Patient will demonstrate improved function as indicated by a score of greater than or equal to 74 out of 100 on FOTO. [x] Progressing  [] Met  [] Not Met     MOBILITY: Patient will improve AROM to stated goals, listed in objective measures above, in order to return to maximal functional potential and improve quality of life.  [x] Progressing  [] Met  [] Not Met     STRENGTH: Patient will improve strength to stated goals, listed in objective measures above, in order to improve functional independence and quality of life.  [x] Progressing  [] Met  [] Not Met     Patient will return to normal ADL's, IADL's, community involvement, recreational activities, and work-related activities with less than or equal to 2/10 pain and maximal function.  [x] Progressing  [] Met  [] Not Met          Plan     Continue Plan of Care (POC) and progress per patient tolerance. See treatment section for details on planned progressions next session.    6/14/2024 (eval): Outpatient Physical Therapy 2 times weekly for 12 weeks to include any combination of the following interventions: virtual visits, dry needling, modalities, electrical stimulation (IFC, Pre-Mod, Attended with Functional Dry Needling), Aquatic Therapy, Cervical/Lumbar Traction, Gait Training, Manual Therapy, Neuromuscular Re-ed, Patient Education, Self Care, Therapeutic Exercise, and Therapeutic Activites     Natasha Herrera, PT

## 2024-07-05 ENCOUNTER — CLINICAL SUPPORT (OUTPATIENT)
Dept: REHABILITATION | Facility: HOSPITAL | Age: 34
End: 2024-07-05
Payer: COMMERCIAL

## 2024-07-05 DIAGNOSIS — Z74.09 DECREASED STRENGTH, ENDURANCE, AND MOBILITY: ICD-10-CM

## 2024-07-05 DIAGNOSIS — R53.1 DECREASED STRENGTH, ENDURANCE, AND MOBILITY: ICD-10-CM

## 2024-07-05 DIAGNOSIS — M54.2 NECK PAIN: ICD-10-CM

## 2024-07-05 DIAGNOSIS — G43.711 INTRACTABLE CHRONIC MIGRAINE WITHOUT AURA AND WITH STATUS MIGRAINOSUS: Primary | ICD-10-CM

## 2024-07-05 DIAGNOSIS — M54.81 OCCIPITAL NEURALGIA OF LEFT SIDE: ICD-10-CM

## 2024-07-05 DIAGNOSIS — R29.898 DECREASED RANGE OF MOTION OF NECK: ICD-10-CM

## 2024-07-05 DIAGNOSIS — R68.89 DECREASED STRENGTH, ENDURANCE, AND MOBILITY: ICD-10-CM

## 2024-07-05 PROCEDURE — 97140 MANUAL THERAPY 1/> REGIONS: CPT | Performed by: PHYSICAL THERAPIST

## 2024-07-05 PROCEDURE — 97535 SELF CARE MNGMENT TRAINING: CPT | Performed by: PHYSICAL THERAPIST

## 2024-07-05 PROCEDURE — 97112 NEUROMUSCULAR REEDUCATION: CPT | Performed by: PHYSICAL THERAPIST

## 2024-07-05 PROCEDURE — 97530 THERAPEUTIC ACTIVITIES: CPT | Performed by: PHYSICAL THERAPIST

## 2024-07-05 NOTE — PROGRESS NOTES
[facility]   Physical Therapy Treatment Note        Name: Juanita Taylor  Clinic Number: [id number]    Therapy Diagnosis:   Encounter Diagnoses   Name Primary?    Intractable chronic migraine without aura and with status migrainosus Yes    Occipital neuralgia of left side     Neck pain     Decreased range of motion of neck     Decreased strength, endurance, and mobility      Physician: Tracie Elder NP    Visit Date: 7/5/2024    Physician Orders: PT Eval and Treat  Medical Diagnosis from Referral: intractable chronic migraine without aura and with status migrainosus, occipital neuralgia of left side, analgesic rebound headache  Evaluation Date: 6/14/2024  Authorization Period Expiration: 12/31/2024  Plan of Care Expiration: 9/12/2024  Progress Note Due: 7/14/2024  Visit # / Visits authorized: 3/12 (+eval)   FOTO: 1/3 (last performed on 6/14/2024)     Precautions: Standard    PTA Visit #: 0/5     Time In: 1107  Time Out: 1220  Total Billable Time: 65 minutes (Billing reflects 1 on 1 treatment time spent with patient)    Subjective     Patient reports:  that she has been hurting since her appointment Monday. She states that she does not necessary think it was anything done during treatment and probably a coincidence. She states that the headache is felt in/behind her left eye. She states that it may have actually been ramping up on Monday, and she has had to take Advil each day this week. She has been noticing the pain in her neck when picking up her girls, which she has been having to do a lot lately. Patient reports that she will be having to switch to her 's insurance after today, and she is unsure if financially she will be able to continue with PT regularly. She would like to establish a detailed HEP and see how she does on her own for a bit. She will let us know if additional PT is necessary. Patient is also willing to try dry needling to see if it will give her more  "immediate and long term relief in tension in her left upper trapezius.     He/She was compliant with home exercise program.  Response to previous treatment: no adverse reaction  Functional change: none noted yet    Pain: 5-6/10     Location: left side of neck    Objective      Objective Measures updated at progress report or POC update only unless otherwise noted.         Treatment     Juanita received the treatments listed below:     CPT Intervention Duration / Intensity Performed   MT Soft Tissue Mobilization, suboccipital release Bilateral (but focus on L), upper trapezius, levator scapula, cervical paraspinals, and suboccipitals x   MT Dry Needling Left Upper Trapezius     Functional Dry Needling  Palpation Assessment to determine the necessity for  Functional Dry Needling with electrical stimulation.   See EMR under MEDIA for written consent  provided on 7/5/2024.     x   NMR UBE for postural strength and endurance  3'/3' x   NMR Cervical isometrics 10x, 5" holds x   NMR Chin Tucks - supine 2 x 10, 3" holds     NMR Series 6 1' each, 2' pec stretch               TA Scapular Retractions  3 x 10, green theraband x   TA Shoulder Extensions 3 x 10, green theraband x   TA Bilateral External rotation 3 x 10, red theraband x   TA Prone Row 2 x 10, B UE     Self Care HEP updated, discussed, and texted to patient  See Patient Instructions for details. Patient expressed understanding of updated HEP plan. Educated patient on the proper form and sequencing of all exercises provided in HEP today     PLAN             CPT Codes available for Billing:   (24) minutes of Manual therapy (MT) to improve pain and ROM.  (00) minutes of Therapeutic Exercise (TE) to develop strength, endurance, range of motion, and flexibility.  (15) minutes of Neuromuscular Re-Education (NMR)  to improve: Balance, Coordination, Kinesthetic, Sense, Proprioception, and Posture.  (10) minutes of Therapeutic Activities (TA) to improve functional " performance.  (11) minutes of Self Care/Education to improve functional at home performance.     Patient Education and Home Exercises       Home Exercises Provided and Patient Education Provided     Education provided: (included in treatment section) minutes  PURPOSE: Patient educated on the impairments noted above and the effects of physical therapy intervention to improve overall condition and QOL.   EXERCISE: Patient was educated on all the above exercise prior/during/after for proper posture, positioning, and execution for safe performance with home exercise program.   STRENGTH: Patient educated on the importance of improved core and extremity strength in order to improve alignment of the spine and extremities with static positions and dynamic movement.   POSTURE: Patient educated on postural awareness to reduce stress and maintain optimal alignment of the spine with static positions and dynamic movement     Written Home Exercises Provided: yes.  Exercises were reviewed and Juanita was able to demonstrate them prior to the end of the session.  Juanita demonstrated good  understanding of the education provided. See EMR under Patient Instructions for exercises provided during therapy sessions.    Assessment     Patient tolerated treatment well. She completed exercises with only minimal difficulty and complaint. Detailed updated HEP established and discussed with patient, incase she is unable to return, and she expressed understanding. It was determined that this patient would benefit from the use of functional dry needling after being cleared for all contraindications. Verbal and written consent obtained. Patient demonstrated appropriate response to Functional Dry Needling with good local twitch response observed with treated muscle groups. Improved tone and tension noted following, and patient reported less tenderness to palpation following. Patient would benefit from continued postural strengthening and  stabilization, and she will let us know if she is able to return to PT in the future.     Juanita is progressing well towards her goals.   Patient prognosis is Good.     Patient will continue to benefit from skilled outpatient physical therapy to address the deficits listed in the problem list box on initial evaluation, provide pt/family education and to maximize patient's level of independence in the home and community environment.     Patient's spiritual, cultural and educational needs considered and pt agreeable to plan of care and goals.     Anticipated Barriers for therapy: none     Goals:  Short Term Goals:  6 weeks Status  Date Met   PAIN: Pt will report worst pain of 6/10 in order to progress toward max functional ability and improve quality of life. [x] Progressing  [] Met  [] Not Met     FUNCTION: Patient will demonstrate improved function as indicated by a score of greater than or equal to 67 out of 100 on FOTO. [x] Progressing  [] Met  [] Not Met     STRENGTH: Patient will improve strength to 50% of stated goals, listed in objective measures above, in order to progress towards independence with functional activities. [x] Progressing  [] Met  [] Not Met     POSTURE: Patient will correct postural deviations in sitting and standing, to decrease pain and promote long term stability.  [x] Progressing  [] Met  [] Not Met     HEP: Patient will demonstrate independence with HEP in order to progress toward functional independence. [] Progressing  [] Met  [] Not Met  7/5/2024      Long Term Goals:  12 weeks Status Date Met   PAIN: Pt will report worst pain of 4/10 in order to progress toward max functional ability and improve quality of life [x] Progressing  [] Met  [] Not Met     FUNCTION: Patient will demonstrate improved function as indicated by a score of greater than or equal to 74 out of 100 on FOTO. [x] Progressing  [] Met  [] Not Met     MOBILITY: Patient will improve AROM to stated goals, listed in  objective measures above, in order to return to maximal functional potential and improve quality of life.  [x] Progressing  [] Met  [] Not Met     STRENGTH: Patient will improve strength to stated goals, listed in objective measures above, in order to improve functional independence and quality of life.  [x] Progressing  [] Met  [] Not Met     Patient will return to normal ADL's, IADL's, community involvement, recreational activities, and work-related activities with less than or equal to 2/10 pain and maximal function.  [x] Progressing  [] Met  [] Not Met          Plan     Continue Plan of Care (POC) and progress per patient tolerance. See treatment section for details on planned progressions next session.    6/14/2024 (eval): Outpatient Physical Therapy 2 times weekly for 12 weeks to include any combination of the following interventions: virtual visits, dry needling, modalities, electrical stimulation (IFC, Pre-Mod, Attended with Functional Dry Needling), Aquatic Therapy, Cervical/Lumbar Traction, Gait Training, Manual Therapy, Neuromuscular Re-ed, Patient Education, Self Care, Therapeutic Exercise, and Therapeutic Activites     Natasha Herrera, PT

## 2024-07-09 ENCOUNTER — TELEPHONE (OUTPATIENT)
Dept: REHABILITATION | Facility: HOSPITAL | Age: 34
End: 2024-07-09
Payer: COMMERCIAL

## 2024-07-09 NOTE — TELEPHONE ENCOUNTER
Called to check on patient. She states that she would say overall she feels better, but continues to have good and bad days. She states that's he has been very busy and unable to look into if she will be able to come in for future visits. However, she will let us know soon.  Natasha Tomlin, PT, DPT

## 2024-09-19 ENCOUNTER — DOCUMENTATION ONLY (OUTPATIENT)
Dept: REHABILITATION | Facility: HOSPITAL | Age: 34
End: 2024-09-19
Payer: COMMERCIAL

## 2024-09-19 NOTE — PROGRESS NOTES
[facility]  Physical Therapy Discharge Note    Name: Juanita Taylor  Clinic Number: [id number]    Therapy Diagnosis:        Encounter Diagnoses   Name Primary?    Intractable chronic migraine without aura and with status migrainosus Yes    Occipital neuralgia of left side      Neck pain      Decreased range of motion of neck      Decreased strength, endurance, and mobility        Physician: Tracie Elder NP    Physician Orders: PT Eval and Treat  Medical Diagnosis from Referral: intractable chronic migraine without aura and with status migrainosus, occipital neuralgia of left side, analgesic rebound headache  Evaluation Date: 6/14/2024      Date of Last visit: 7/5/2024  Total Visits Received: 4    ASSESSMENT      Patient did not return for final assessment.     Discharge reason: Patient has not attended therapy since 7/5/2024    Discharge FOTO Score: N/A    Goals:   Short Term Goals:  6 weeks Status  Date Met   PAIN: Pt will report worst pain of 6/10 in order to progress toward max functional ability and improve quality of life. [x] Progressing  [] Met  [] Not Met     FUNCTION: Patient will demonstrate improved function as indicated by a score of greater than or equal to 67 out of 100 on FOTO. [x] Progressing  [] Met  [] Not Met     STRENGTH: Patient will improve strength to 50% of stated goals, listed in objective measures above, in order to progress towards independence with functional activities. [x] Progressing  [] Met  [] Not Met     POSTURE: Patient will correct postural deviations in sitting and standing, to decrease pain and promote long term stability.  [x] Progressing  [] Met  [] Not Met     HEP: Patient will demonstrate independence with HEP in order to progress toward functional independence. [] Progressing  [] Met  [] Not Met  7/5/2024      Long Term Goals:  12 weeks Status Date Met   PAIN: Pt will report worst pain of 4/10 in order to progress toward max functional  ability and improve quality of life [x] Progressing  [] Met  [] Not Met     FUNCTION: Patient will demonstrate improved function as indicated by a score of greater than or equal to 74 out of 100 on FOTO. [x] Progressing  [] Met  [] Not Met     MOBILITY: Patient will improve AROM to stated goals, listed in objective measures above, in order to return to maximal functional potential and improve quality of life.  [x] Progressing  [] Met  [] Not Met     STRENGTH: Patient will improve strength to stated goals, listed in objective measures above, in order to improve functional independence and quality of life.  [x] Progressing  [] Met  [] Not Met     Patient will return to normal ADL's, IADL's, community involvement, recreational activities, and work-related activities with less than or equal to 2/10 pain and maximal function.  [x] Progressing  [] Met  [] Not Met        PLAN   This patient is discharged from Physical Therapy      Natasha Herrera, PT

## 2024-10-17 ENCOUNTER — PATIENT MESSAGE (OUTPATIENT)
Dept: FAMILY MEDICINE | Facility: CLINIC | Age: 34
End: 2024-10-17
Payer: COMMERCIAL

## 2024-10-17 ENCOUNTER — LAB VISIT (OUTPATIENT)
Dept: LAB | Facility: HOSPITAL | Age: 34
End: 2024-10-17
Attending: INTERNAL MEDICINE
Payer: COMMERCIAL

## 2024-10-17 DIAGNOSIS — M89.8X9 BONE PAIN: Primary | ICD-10-CM

## 2024-10-17 DIAGNOSIS — M89.8X9 BONE PAIN: ICD-10-CM

## 2024-10-17 LAB
BASOPHILS # BLD AUTO: 0.03 K/UL (ref 0–0.2)
BASOPHILS NFR BLD: 0.7 % (ref 0–1.9)
DIFFERENTIAL METHOD BLD: ABNORMAL
EOSINOPHIL # BLD AUTO: 0 K/UL (ref 0–0.5)
EOSINOPHIL NFR BLD: 0.7 % (ref 0–8)
ERYTHROCYTE [DISTWIDTH] IN BLOOD BY AUTOMATED COUNT: 11.8 % (ref 11.5–14.5)
HCT VFR BLD AUTO: 45.1 % (ref 37–48.5)
HGB BLD-MCNC: 15.1 G/DL (ref 12–16)
IMM GRANULOCYTES # BLD AUTO: 0.01 K/UL (ref 0–0.04)
IMM GRANULOCYTES NFR BLD AUTO: 0.2 % (ref 0–0.5)
LYMPHOCYTES # BLD AUTO: 1.5 K/UL (ref 1–4.8)
LYMPHOCYTES NFR BLD: 33 % (ref 18–48)
MCH RBC QN AUTO: 33.9 PG (ref 27–31)
MCHC RBC AUTO-ENTMCNC: 33.5 G/DL (ref 32–36)
MCV RBC AUTO: 101 FL (ref 82–98)
MONOCYTES # BLD AUTO: 0.3 K/UL (ref 0.3–1)
MONOCYTES NFR BLD: 6.2 % (ref 4–15)
NEUTROPHILS # BLD AUTO: 2.7 K/UL (ref 1.8–7.7)
NEUTROPHILS NFR BLD: 59.2 % (ref 38–73)
NRBC BLD-RTO: 0 /100 WBC
PLATELET # BLD AUTO: 234 K/UL (ref 150–450)
PMV BLD AUTO: 10.9 FL (ref 9.2–12.9)
RBC # BLD AUTO: 4.46 M/UL (ref 4–5.4)
WBC # BLD AUTO: 4.52 K/UL (ref 3.9–12.7)

## 2024-10-17 PROCEDURE — 82607 VITAMIN B-12: CPT | Performed by: INTERNAL MEDICINE

## 2024-10-17 PROCEDURE — 84443 ASSAY THYROID STIM HORMONE: CPT | Performed by: INTERNAL MEDICINE

## 2024-10-17 PROCEDURE — 82652 VIT D 1 25-DIHYDROXY: CPT | Performed by: INTERNAL MEDICINE

## 2024-10-17 PROCEDURE — 80053 COMPREHEN METABOLIC PANEL: CPT | Performed by: INTERNAL MEDICINE

## 2024-10-17 PROCEDURE — 85025 COMPLETE CBC W/AUTO DIFF WBC: CPT | Performed by: INTERNAL MEDICINE

## 2024-10-18 ENCOUNTER — TELEPHONE (OUTPATIENT)
Dept: FAMILY MEDICINE | Facility: CLINIC | Age: 34
End: 2024-10-18
Payer: COMMERCIAL

## 2024-10-18 LAB
ALBUMIN SERPL BCP-MCNC: 4.6 G/DL (ref 3.5–5.2)
ALP SERPL-CCNC: 40 U/L (ref 40–150)
ALT SERPL W/O P-5'-P-CCNC: 15 U/L (ref 10–44)
ANION GAP SERPL CALC-SCNC: 8 MMOL/L (ref 8–16)
AST SERPL-CCNC: 20 U/L (ref 10–40)
BILIRUB SERPL-MCNC: 1.1 MG/DL (ref 0.1–1)
BUN SERPL-MCNC: 13 MG/DL (ref 6–20)
CALCIUM SERPL-MCNC: 9.5 MG/DL (ref 8.7–10.5)
CHLORIDE SERPL-SCNC: 104 MMOL/L (ref 95–110)
CO2 SERPL-SCNC: 28 MMOL/L (ref 23–29)
CREAT SERPL-MCNC: 0.8 MG/DL (ref 0.5–1.4)
EST. GFR  (NO RACE VARIABLE): >60 ML/MIN/1.73 M^2
GLUCOSE SERPL-MCNC: 73 MG/DL (ref 70–110)
POTASSIUM SERPL-SCNC: 3.7 MMOL/L (ref 3.5–5.1)
PROT SERPL-MCNC: 7.7 G/DL (ref 6–8.4)
SODIUM SERPL-SCNC: 140 MMOL/L (ref 136–145)
TSH SERPL DL<=0.005 MIU/L-ACNC: 1.44 UIU/ML (ref 0.4–4)
VIT B12 SERPL-MCNC: 785 PG/ML (ref 210–950)

## 2024-10-18 NOTE — TELEPHONE ENCOUNTER
----- Message from Prema Alvarez DO sent at 10/18/2024  7:25 AM CDT -----  Total bilirubin elevated. Make sure you are staying hydrated and eating fiber.  Other labs normal but some still pending at this time   Speech Therapy Treatment Note      Visit Type: Treatment  -  Communication/cognition    Precautions     - Medical precautions:  fall risk; standard precautions.   COVID-19 status: Negative test on 5/28/22  PPE donned: level 3 procedure mask  Aerosolizing events during session?: None  No AGP events- door to room was not closed.        Current Diet: general and thin liquids      - Feeding: feeds self    SUBJECTIVE  Patient alert and cooperative, seated upright in hospital chair.     SPEECH PATHOLOGY HOSPITAL COURSE:  6/3/22: continue com/cog for reading/writing, orientation, attention, memory, problem-solving.    6/1/22: Discharge swallow goals. COM/COG eval: Lettsworth Cognitive Assessment (MoCA): 10/30  5/29/22: Brain MRI: \"1. Several punctate foci of acute versus early subacute ischemia involving the right BRUNILDA and MCA territory. 2.  Supratentorial white matter remote microvascular ischemia and atrophy.\" Swallow evaluation: recommend general/thin, periodic supervision.  May need assist with tray setup.  5/28/22: Stat CT of head: \"No acute changes\".  5/28/22: admit left sided hemiparesis and ataxia, facial droop, unable to walk.  Fall.  TPA administered.  NIHSS 6.  Repeat NIHSS 3.  Passed dysphagia screen.  Nystagmus noted. CT of head: \"1.  No large vessel occlusion or aneurysm. 2.  Moderate calcific atherosclerosis of the vertebral V4 segments with potentially high-grade stenosis of the nondominant right V4 segment.\" CTA neck: \"1. Moderate to severe atherosclerotic origin stenosis of bilateral vertebral arteries. 2.  Patulous/minimally dilated esophagus. 3. Moderate severe multilevel degenerative spondylosis of the cervical Spine.\"  -----   5/7/22: [facility] hospital admission confusion, fall.  -----  7/18/2021: Admit [facility] with fall, and then re-admit with aspiration pneumonia.    MD note states \"Acute metabolic encephalopathy--likely due to aspiration pneumonia, alcohol use in addition to baseline memory issues\"   EDUAROD recommended as therapy report patient not safe to live alone.  Psychology evaluation determine patient decisional.  7/19/21: SLP recommend general/thin.  5/30 SLUMS, suspect delirium.     Baseline: . Lives alone. Lacks family support. Retired  but reportedly has medical degree and graduated from [employer]. Independent with iADLs including meds, finances, appointments and driving. 2021 therpay recommended EDUARDO, patient decided to go home.     Relevant PMH to Speech Pathology: MI, CAD, HTN, former smoker     Relevant Past Speech Pathology Intervention:  None per Epic reviewPatient agreed to participate in therapy this date.   Patient/family goals: return home     OBJECTIVE      Communication/Cognition:  Orientation Level:      - Person: oriented    - Place: oriented    - Month: oriented    - Year: not oriented    - Date: not oriented    - Day of week: not oriented    - Reason for hospitalization: oriented  Observation: Pt alert and cooperative.  Behavior/Social Interaction:      - Cooperates with tasks: intact (>90% accuracy)    - Maintains eye contact: intact (>90% accuracy)    - Pragmatics: intact (>90% accuracy)    - Appropriate behavior: intact (>90% accuracy)    - Emotional lability: intact (>90% accuracy)    - Affect: intact (>90% accuracy)    - Frustration level: intact (>90% accuracy)  Expression-Verbal:     - Indicates needs (gesturally/verbally): intact (>90% accuracy)  Expression-Written:      - Hold writing utensil: able to hold writing utensil and using right hand dominant    - Legibility: intact (>90% accuracy)    - : intact (>90% accuracy)  Auditory Comprehension:      - Localizes to sound: Yes    - Sentences: moderate impairment (50-74% accuracy)    - Conversations - simple: intact (>90% accuracy)  Visual Recognition:      - Acuity: no visual deficits reported    - Left Neglect: mild impairment (75-89% accuracy)    - Inattention: mild impairment  (75-89% accuracy)  Reading Comprehension:      - Scanning/tracking: intact (>90% accuracy)    - Words: intact (>90% accuracy)    - Sentences: moderate impairment (50-74% accuracy)    - Paragraphs: severe impairment (25-49% accuracy)    - Functional reading: moderate impairment (50-74% accuracy)  Oral Reading:      - Intact (> 90% accuracy)   Attention:      - Selective attention: moderate impairment (50-74% accuracy)    - Sustained attention: mild impairment (75-89% accuracy)  Memory:       - Immediate memory: mild impairment (75-89% accuracy)    - Delayed memory:  Moderate impairment (50-74% accuracy)    - Working memory: severe impairment (25-49% accuracy)   Problem Solving:      - Simple problem solving: moderate impairment (50-74% accuracy)  Numerical Reasoning:      - Simple calculation: mild impairment (75-89% accuracy)             ASSESSMENT  Impairments: swallowing  Functional Limitations: eating/drinking  The patient was seen on 10LM with RN approval.    Patient seen bedside for com/cog treatment session targeting functional orientation, attention, memory, and problem-solving tasks. Marked improvement in pragmatics, auditory comprehension, verbal expression since 6/1 therapy session. Still mild left neglect, able to attend to left with cues. Patient oriented to person/place/situation but stated date was 6/5/2025, unable to fix error with cues. Completed verbal/written problem solving with medications, calendar, checkbook; still with difficulty with reading comprehension, attention to detail, self-monitoring/correcting errors. Writer emphasized previous education with patient regarding recommendations for home discharge which include 24/7 supervision and assist with iADLs. Patient receptive. Educated to recs for subacute rehab with continued therapy prior to discharge home. Patient remains agreeable with recs after education; however, suspect will continue to require ongoing education due to impaired memory,  comprehension and insight.      Requires SLP Follow Up: Yes    Discharge Recommendations  SLP Identified Barriers to Discharge: medical, assist with iADLs  Recommendation for Discharge Location: SLP [place]: Post-acute facility with therapy needs  Recommendation for Discharge Support: SLP [place]: Intermittent assist daily, Assistance with medication, Assistance with IADLs    Progress: Progressing toward goals       • Rehab Potential: good     • Skilled therapy is required to address these limitations in attempt to maximize the patient's independence.    Education Provided:   Learning Assessment:  - Primary learner: patient  - Are they ready to learn: yes  - Preferred learning style: verbal  - Barriers to learning: cognitive  Education provided during session:  - Receiving Education: patient  - communication and cognition  - Results of above outlined education: Needs reinforcement    Patient at end of session:    - location: in chair    - safety measures: alarm system in place/re-engaged and call light within reach    - hand off to: nurse    PLAN  Swallow: discharged 6/1.     COM/COG: Further assess for ? left inattention/neglect. Primary goals to target orientation, basic attention, memory, problem solving and insight. Secondary goals to target 3-unit auditory comprehension, complex verbal expression.      Frequency: X MTh cc (6/3)    Interventions:  Communication/cognition therapy and patient/family education    Plan/Goal Agreement:  Patient agrees with goals and individualized plan of care    RECOMMENDATIONS     -Diet:          *general and thin liquids    -Medication Administration:         *whole and patient preference    -Feeding Guidelines:          *eat slowly only when alert, feeds self, periodic/intermittent supervision, sit fully upright for all po intake and small bites/sips    -Additional Swallow Recommendations:         *frequent oral care    -Speech Reviewed Swallow:         *with patient/family and with  clinical caregivers    GOALS  Review Date: 6/8/2022  Short Term Goals:   Short Term Goal 1: Same as LTG #1.  Goal Progress: Met 6/1; complete.    Short Term Goal 2: Pt will be oriented to place and date x3 sessions with min cues. Progressing 6/3/22    Short Term Goal 3: Pt will attend to target stimuli with 80% accuracy with mild cues. MET 6/3/22    Short Term Goal 4: Pt will recall 5 details from daily events with min cues. Progressing 6/3/22    Short Term Goal 5: Pt will complete basic problem solving, reasoning and insight tasks with 70% accuracy. Progressing 6/3/22    Long Term Goals:   Long Term Goal 1: Tolerate least restrictive oral diet <10% SS aspiration/penetration. MET 6/3/22    Long Term Goal 2: Pt will demonstrate appropriate executive functioning skills to complete iADLs with supervision.  Documented in the chart in the following areas: Pain. Assessment.      Therapy procedure time and total treatment time can be found documented on the Time Entry flowsheet

## 2024-10-21 LAB — 1,25(OH)2D3 SERPL-MCNC: 65 PG/ML (ref 20–79)

## 2024-10-22 ENCOUNTER — TELEPHONE (OUTPATIENT)
Dept: FAMILY MEDICINE | Facility: CLINIC | Age: 34
End: 2024-10-22
Payer: COMMERCIAL

## 2024-11-05 ENCOUNTER — PATIENT MESSAGE (OUTPATIENT)
Dept: RESEARCH | Facility: HOSPITAL | Age: 34
End: 2024-11-05
Payer: COMMERCIAL

## 2024-11-21 ENCOUNTER — OFFICE VISIT (OUTPATIENT)
Dept: URGENT CARE | Facility: CLINIC | Age: 34
End: 2024-11-21
Payer: COMMERCIAL

## 2024-11-21 VITALS
SYSTOLIC BLOOD PRESSURE: 119 MMHG | HEIGHT: 62 IN | WEIGHT: 100 LBS | OXYGEN SATURATION: 99 % | BODY MASS INDEX: 18.4 KG/M2 | TEMPERATURE: 98 F | DIASTOLIC BLOOD PRESSURE: 76 MMHG | HEART RATE: 79 BPM | RESPIRATION RATE: 16 BRPM

## 2024-11-21 DIAGNOSIS — R30.0 DYSURIA: ICD-10-CM

## 2024-11-21 DIAGNOSIS — N30.00 ACUTE CYSTITIS WITHOUT HEMATURIA: Primary | ICD-10-CM

## 2024-11-21 LAB
BILIRUBIN, UA POC OHS: NEGATIVE
BLOOD, UA POC OHS: ABNORMAL
CLARITY, UA POC OHS: ABNORMAL
COLOR, UA POC OHS: YELLOW
GLUCOSE, UA POC OHS: NEGATIVE
KETONES, UA POC OHS: NEGATIVE
LEUKOCYTES, UA POC OHS: ABNORMAL
NITRITE, UA POC OHS: NEGATIVE
PH, UA POC OHS: 7
PROTEIN, UA POC OHS: NEGATIVE
SPECIFIC GRAVITY, UA POC OHS: 1.02
UROBILINOGEN, UA POC OHS: 0.2

## 2024-11-21 RX ORDER — NITROFURANTOIN 25; 75 MG/1; MG/1
100 CAPSULE ORAL 2 TIMES DAILY
Qty: 14 CAPSULE | Refills: 0 | Status: SHIPPED | OUTPATIENT
Start: 2024-11-21 | End: 2024-11-28

## 2024-11-21 RX ORDER — PHENAZOPYRIDINE HYDROCHLORIDE 200 MG/1
200 TABLET, FILM COATED ORAL
Qty: 6 TABLET | Refills: 0 | Status: SHIPPED | OUTPATIENT
Start: 2024-11-21 | End: 2024-11-23

## 2024-11-22 NOTE — PROGRESS NOTES
"Subjective:      Patient ID: Juanita Taylor is a 34 y.o. female.    Vitals:  height is 5' 2" (1.575 m) and weight is 45.4 kg (100 lb). Her tympanic temperature is 97.9 °F (36.6 °C). Her blood pressure is 119/76 and her pulse is 79. Her respiration is 16 and oxygen saturation is 99%.     Chief Complaint: Dysuria    34 yr old female presents to the Urgent Care with complaint burning sensation with urination associated with lower back pain, urinary frequency and pelvic discomfort today. Patient last UTI was 1 year ago. Patient denies any CP, SOB, or fever at this time.     Dysuria   This is a new problem. The current episode started today. The problem has been gradually worsening. The quality of the pain is described as burning. The pain is at a severity of 3/10. There has been no fever. She is Sexually active. There is No history of pyelonephritis. Associated symptoms include flank pain, frequency and urgency. Pertinent negatives include no behavior changes, chills, discharge, hematuria, hesitancy, nausea, possible pregnancy, sweats, vomiting, weight loss, bubble bath use, constipation, rash or withholding. She has tried nothing for the symptoms. Her past medical history is significant for recurrent UTIs. There is no history of catheterization, diabetes insipidus, diabetes mellitus, genitourinary reflux, hypertension, kidney stones, a single kidney, STD, urinary stasis or a urological procedure.       Constitution: Negative for chills.   Gastrointestinal:  Negative for nausea, vomiting and constipation.   Genitourinary:  Positive for dysuria, frequency, urgency and flank pain. Negative for urine decreased and hematuria.   Skin:  Negative for rash.      Objective:     Physical Exam   Constitutional: She is oriented to person, place, and time. She appears well-developed. She is cooperative. She is easily aroused.  Non-toxic appearance. She does not appear ill. No distress.   HENT:   Head: Normocephalic and " atraumatic.   Nose: Nose abnormal.   Mouth/Throat: Mucous membranes are normal.   Eyes: Conjunctivae and lids are normal.   Cardiovascular: Normal rate, regular rhythm and normal heart sounds.   Pulmonary/Chest: Effort normal and breath sounds normal. No respiratory distress.   Abdominal: Normal appearance and bowel sounds are normal. She exhibits no distension, no abdominal bruit, no pulsatile midline mass and no mass. Soft. There is no abdominal tenderness. There is no rigidity, no rebound, no guarding, no tenderness at McBurney's point, negative Benavidez's sign, no left CVA tenderness and no right CVA tenderness.   Musculoskeletal: Normal range of motion.         General: No edema. Normal range of motion.   Neurological: She is alert, oriented to person, place, and time and easily aroused. She has normal strength.   Skin: Skin is warm, dry, intact, not diaphoretic and not pale.   Psychiatric: Her speech is normal and behavior is normal. Judgment and thought content normal.   Nursing note and vitals reviewed.      Assessment:     1. Acute cystitis without hematuria    2. Dysuria      Results for orders placed or performed in visit on 11/21/24   POCT Urinalysis(Instrument)    Collection Time: 11/21/24  6:17 PM   Result Value Ref Range    Color, POC UA Yellow Yellow, Straw, Colorless    Clarity, POC UA Cloudy (A) Clear    Glucose, POC UA Negative Negative    Bilirubin, POC UA Negative Negative    Ketones, POC UA Negative Negative    Spec Grav POC UA 1.020 1.005 - 1.030    Blood, POC UA Moderate (A) Negative    pH, POC UA 7.0 5.0 - 8.0    Protein, POC UA Negative Negative    Urobilinogen, POC UA 0.2 <=1.0    Nitrite, POC UA Negative Negative    WBC, POC UA Small (A) Negative       Plan:    ROS positive for: dysuria; ROS negative for fever. The patient is a non-toxic, afebrile, and well appearing female. On physical exam, there is no abdominal tenderness, distention, peritoneal signs, or CVA tenderness.     Vital Signs:  reassuring  Lab\Radiology\Other Procedure RESULTS: UA positive    Given the above findings, my overall impression is UTI. Given the above findings, I do not think the patient has pyelonephritis or STI .    The patient will be discharged home with Macrobid and Pyridium rx. Additional home care recommendations include increase water intake. The diagnosis, treatment plan, instructions for follow-up and reevaluation with her PCP as well as ED precautions have been discussed with the patient and she has verbalized an understanding of the information. All questions or concerns from the patient have been addressed.     Acute cystitis without hematuria  -     nitrofurantoin, macrocrystal-monohydrate, (MACROBID) 100 MG capsule; Take 1 capsule (100 mg total) by mouth 2 (two) times daily. for 7 days  Dispense: 14 capsule; Refill: 0  -     phenazopyridine (PYRIDIUM) 200 MG tablet; Take 1 tablet (200 mg total) by mouth 3 (three) times daily with meals. for 2 days  Dispense: 6 tablet; Refill: 0    Dysuria  -     POCT Urinalysis(Instrument)      Patient Instructions   If you were prescribed a narcotic or controlled medication, do not drive or operate heavy equipment or machinery while taking these medications.  You must understand that you've received an Urgent Care treatment only and that you may be released before all your medical problems are known or treated. You, the patient, will arrange for follow up care as instructed.  Follow up with your PCP or specialty clinic as directed within 2-5 days if not improved or as needed.  You can call [phone] to schedule an appointment with the appropriate provider.  If your condition worsens we recommend that you receive another evaluation at the emergency room immediately or contact your primary medical clinics after hours call service to discuss your concerns.  Please return here or go to the Emergency Department for any concerns or worsening of condition.

## 2024-11-22 NOTE — PATIENT INSTRUCTIONS

## 2024-12-18 ENCOUNTER — OFFICE VISIT (OUTPATIENT)
Dept: OPHTHALMOLOGY | Facility: CLINIC | Age: 34
End: 2024-12-18
Payer: COMMERCIAL

## 2024-12-18 ENCOUNTER — PATIENT MESSAGE (OUTPATIENT)
Dept: OPHTHALMOLOGY | Facility: CLINIC | Age: 34
End: 2024-12-18

## 2024-12-18 DIAGNOSIS — G43.711 INTRACTABLE CHRONIC MIGRAINE WITHOUT AURA AND WITH STATUS MIGRAINOSUS: Primary | ICD-10-CM

## 2024-12-18 DIAGNOSIS — H52.7 REFRACTIVE ERRORS: ICD-10-CM

## 2024-12-18 PROCEDURE — 99999 PR PBB SHADOW E&M-EST. PATIENT-LVL II: CPT | Mod: PBBFAC,,, | Performed by: OPTOMETRIST

## 2024-12-18 PROCEDURE — 92004 COMPRE OPH EXAM NEW PT 1/>: CPT | Mod: S$GLB,,, | Performed by: OPTOMETRIST

## 2024-12-18 PROCEDURE — 1159F MED LIST DOCD IN RCRD: CPT | Mod: CPTII,S$GLB,, | Performed by: OPTOMETRIST

## 2024-12-18 PROCEDURE — 92015 DETERMINE REFRACTIVE STATE: CPT | Mod: S$GLB,,, | Performed by: OPTOMETRIST

## 2024-12-18 NOTE — PROGRESS NOTES
SUBJECTIVE  Juanita Taylor is 34 y.o. female  Uncorrected distance visual acuity was 20/20 in the right eye and 20/20 in the left eye. Uncorrected near visual acuity was J3 in the right eye and J4 in the left eye.   Chief Complaint   Patient presents with    Annual Exam          HPI    New Patient   Last eye exam around 10 years ago.    Patient notice a vision change this past Saturday while driving at night   seen vertical lines due to the on coming headlights and only notice it   while she was driving. Back in June of this year she went seen a   Neurologist due to headaches and pressure pain behind her left eye which   was diagnosed as Occipital neuralgia of the left side, and the doctor   referred her to come and have her eyes checked.  Sometimes mild pressure pain behind the left eye due to headaches.  Not using any otc drops.   Occasionally wear blue light glasses.   Last edited by Anyi Pierce on 12/18/2024 10:34 AM.         Assessment /Plan :  1. Intractable chronic migraine without aura and with status migrainosus  Healthy, well perfused optic nerves.    2. Refractive errors  Mild hyperopia, will try Rx with blue filter for computer work.  RTC annually

## 2025-02-18 ENCOUNTER — OFFICE VISIT (OUTPATIENT)
Dept: FAMILY MEDICINE | Facility: CLINIC | Age: 35
End: 2025-02-18
Payer: COMMERCIAL

## 2025-02-18 VITALS
BODY MASS INDEX: 18.5 KG/M2 | HEART RATE: 92 BPM | TEMPERATURE: 98 F | DIASTOLIC BLOOD PRESSURE: 70 MMHG | HEIGHT: 62 IN | WEIGHT: 100.5 LBS | SYSTOLIC BLOOD PRESSURE: 120 MMHG | OXYGEN SATURATION: 98 % | RESPIRATION RATE: 18 BRPM

## 2025-02-18 DIAGNOSIS — F41.1 ANXIETY, GENERALIZED: Primary | ICD-10-CM

## 2025-02-18 NOTE — PROGRESS NOTES
Juanita Taylor  MRN:  [id number]  34 y.o. female      Patient Care Team:  Re Crespo MD as Obstetrician (Obstetrics and Gynecology)  [facility] as EpicCare Link Share  Valencia Sawant FNP as Nurse Practitioner (Family Medicine)      SUBJECTIVE:     CHIEF COMPLAINT:  - Ms. Taylor presents with concerns about anxiety symptoms, including chest discomfort and shortness of breath.    HPI:  Ms. Taylor reports anxiety symptoms, particularly noting a recent episode of chest discomfort and shortness of breath. Yesterday, while on a phone call, she had shortness of breath and an unusual sensation in her chest, which was not associated with pressure or pinching. The discomfort subsequently shifted to the left side. She feels anxious, on edge, and irritable, especially when interacting with her children. She reports feeling overwhelmed and having cognitive difficulties over the past 24 hours.    She consulted a therapist in December or January but feels that this provider may not be meeting her needs. She expresses interest in consulting a psychologist or psychiatrist for a more comprehensive evaluation and potential diagnosis, mentioning the possibility of PTSD or OCD. She is considering an evaluation with Dr. Ratna Lancaster. She reports being highly sensitive to medications and prefers avoiding them if possible.       Review of Systems   Constitutional: Negative.    Respiratory:  Positive for shortness of breath.    Cardiovascular:  Positive for chest pain and palpitations.   Skin: Negative.    Neurological:  Negative for dizziness and headaches.   Psychiatric/Behavioral:  Negative for depression, hallucinations, memory loss, substance abuse and suicidal ideas. The patient is nervous/anxious. The patient does not have insomnia.            Review of patient's allergies indicates:   Allergen Reactions    Penicillamine      Other reaction(s): Unknown  Other reaction(s): Unknown     "Penicillins      Other reaction(s): Other (See Comments)  Family members are allergic so she does not take it    Benzoyl peroxide Rash         Problem List[1]    No current outpatient medications      Past medical, surgical, family and social histories have been reviewed today.      OBJECTIVE:     Vitals:    02/18/25 1124   BP: 120/70   Pulse: 92   Resp: 18   Temp: 98.2 °F (36.8 °C)   TempSrc: Tympanic   SpO2: 98%   Weight: 45.6 kg (100 lb 8.5 oz)   Height: 5' 2" (1.575 m)     Vitals:    02/18/25 1124   PainSc: 0-No pain       Physical Exam  Vitals reviewed.   Constitutional:       General: She is not in acute distress.     Appearance: She is not ill-appearing or diaphoretic.   HENT:      Head: Normocephalic and atraumatic.   Neurological:      Mental Status: She is alert.   Psychiatric:         Attention and Perception: Attention and perception normal. She is attentive. She does not perceive auditory or visual hallucinations.         Mood and Affect: Mood and affect normal.         Speech: Speech normal.         Behavior: Behavior normal. Behavior is cooperative.         Thought Content: Thought content normal. Thought content is not paranoid or delusional. Thought content does not include homicidal or suicidal ideation.         Cognition and Memory: Cognition is not impaired. Memory is not impaired.         Judgment: Judgment normal. Judgment is not impulsive or inappropriate.         ASSESSMENT:     1. Anxiety, generalized ----- chronic issue, uncontrolled/managed.  CBT strongly recommended, plans to possibly f/u with Dr. Lancaster in [facility] for evaluation (Psychiatry).  Monitor.      PLAN:     Follow-up in clinic to A with MD.  RTC as directed and/or prn.        SOCRATES Frank  [facility]       Future Appointments   Date Time Provider Department Center   5/23/2025  8:50 AM Re Crespo MD [facility] [place] [facility]            [1]   Patient Active Problem List  Diagnosis    " Unspecified vitamin D deficiency    Anxiety, generalized    Intractable chronic migraine without aura and with status migrainosus    Occipital neuralgia of left side    Neck pain    Decreased range of motion of neck    Decreased strength, endurance, and mobility

## 2025-03-25 ENCOUNTER — PATIENT MESSAGE (OUTPATIENT)
Dept: FAMILY MEDICINE | Facility: CLINIC | Age: 35
End: 2025-03-25
Payer: COMMERCIAL

## 2025-03-27 ENCOUNTER — OFFICE VISIT (OUTPATIENT)
Dept: FAMILY MEDICINE | Facility: CLINIC | Age: 35
End: 2025-03-27
Payer: COMMERCIAL

## 2025-03-27 VITALS
WEIGHT: 101.44 LBS | BODY MASS INDEX: 18.67 KG/M2 | TEMPERATURE: 98 F | DIASTOLIC BLOOD PRESSURE: 68 MMHG | HEIGHT: 62 IN | HEART RATE: 95 BPM | OXYGEN SATURATION: 98 % | SYSTOLIC BLOOD PRESSURE: 116 MMHG | RESPIRATION RATE: 18 BRPM

## 2025-03-27 DIAGNOSIS — R10.9 ABDOMINAL PAIN, UNSPECIFIED ABDOMINAL LOCATION: Primary | ICD-10-CM

## 2025-03-27 PROCEDURE — 99999 PR PBB SHADOW E&M-EST. PATIENT-LVL IV: CPT | Mod: PBBFAC,,, | Performed by: REGISTERED NURSE

## 2025-03-27 PROCEDURE — 3008F BODY MASS INDEX DOCD: CPT | Mod: CPTII,S$GLB,, | Performed by: REGISTERED NURSE

## 2025-03-27 PROCEDURE — 1159F MED LIST DOCD IN RCRD: CPT | Mod: CPTII,S$GLB,, | Performed by: REGISTERED NURSE

## 2025-03-27 PROCEDURE — 99214 OFFICE O/P EST MOD 30 MIN: CPT | Mod: S$GLB,,, | Performed by: REGISTERED NURSE

## 2025-03-27 PROCEDURE — 3074F SYST BP LT 130 MM HG: CPT | Mod: CPTII,S$GLB,, | Performed by: REGISTERED NURSE

## 2025-03-27 PROCEDURE — 3078F DIAST BP <80 MM HG: CPT | Mod: CPTII,S$GLB,, | Performed by: REGISTERED NURSE

## 2025-03-27 NOTE — PROGRESS NOTES
"Juanita Taylor  MRN:  [id number]  34 y.o. female      Patient Care Team:  Re Crespo MD as Obstetrician (Obstetrics and Gynecology)  [facility] as EpicCare Link Share  Valencia Sawant FNP as Nurse Practitioner (Family Medicine)  Ratna Lancaster MD as Consulting Physician (Psychiatry)      SUBJECTIVE:     CHIEF COMPLAINT:  - Abdominal pain    HPI:  Ms. Taylor reports pain primarily to the right upper abdomen under the rib cage, which migrates to her back, around her belt line near the umbilicus, and occasionally to her hip. Has at times, radiated into pelvis and between her shoulder blades. Pain has persisted for approximately 2 weeks. She describes the pain as uncomfortable but has difficulty characterizing it precisely. The abdominal pain is persistent but infrequent, occurring daily with varying intensity. The location of the pain changes, but it is most often felt under the rib cage. The onset of pain seems unpredictable and not consistently related to eating. She has attempted to track any correlation with food intake but found no clear pattern.    Acetaminophen does not alleviate the pain, but ibuprofen provides relief. Sometimes the pain is present when she goes to bed but does not wake her during the night.     She denies any recent changes in her diet but does admit to increased intake of fatty foods at times in her diet, as she is trying to "put on some weight".    She has an IUD that was placed in August 2023, but she does not believe it is related to current symptoms as she is not having abnormal bleeding or constant pelvic pain. She had an ultrasound on June 24, 2024, which confirmed the IUD was in place.        Review of Systems   Constitutional:  Negative for chills, fever and malaise/fatigue.   Respiratory: Negative.     Cardiovascular: Negative.    Gastrointestinal:  Positive for abdominal pain. Negative for blood in stool, constipation, diarrhea, heartburn, " "melena, nausea and vomiting.   Genitourinary: Negative.    Skin: Negative.    Neurological: Negative.        Review of patient's allergies indicates:   Allergen Reactions    Penicillamine      Other reaction(s): Unknown  Other reaction(s): Unknown    Penicillins      Other reaction(s): Other (See Comments)  Family members are allergic so she does not take it    Benzoyl peroxide Rash         Problem List[1]      No current outpatient medications      Past medical, surgical, family and social histories have been reviewed today.      OBJECTIVE:     Vitals:    03/27/25 1402   BP: 116/68   Pulse: 95   Resp: 18   Temp: 97.7 °F (36.5 °C)   TempSrc: Tympanic   SpO2: 98%   Weight: 46 kg (101 lb 6.6 oz)   Height: 5' 2" (1.575 m)     Vitals:    03/27/25 1402   PainSc:   4   PainLoc: Abdomen/RUQ       Physical Exam  Vitals reviewed.   Constitutional:       General: She is not in acute distress.     Appearance: She is not ill-appearing or diaphoretic.   HENT:      Head: Normocephalic and atraumatic.   Abdominal:      General: Bowel sounds are normal. There is no distension.      Palpations: Abdomen is soft.      Tenderness: There is abdominal tenderness. There is no right CVA tenderness, guarding or rebound. Negative signs include Benavidez's sign and McBurney's sign.       Skin:     Capillary Refill: Capillary refill takes less than 2 seconds.   Neurological:      Mental Status: She is alert and oriented to person, place, and time.      Motor: No weakness.      Gait: Gait normal.   Psychiatric:         Mood and Affect: Mood normal.         Behavior: Behavior normal.         Thought Content: Thought content normal.         Judgment: Judgment normal.             ASSESSMENT:     1. Abdominal pain, unspecified abdominal location ---- new issue reported, exact cause unclear at this time.  US ordered to mainly check GB, does report increased intake of fatty food in diet but seems to have no bearing on when her pain occurs.  Gyn " monitoring her IUD.  -     US Abdomen Complete; Future; Expected date: 03/27/2025      PLAN:     US pending.  Plans to discuss IUD, if needed, with Gyn.  RTC as directed and/or prn.        SOCRATES Frank  [facility]         30 minutes of total time spent on the encounter, which includes face to face time and non-face to face time preparing to see the patient.  This includes obtaining and/or reviewing separately obtained history, performing a medically appropriate examination and/or evaluation, and counseling and educating the patient/family/caregiver.  Includes documenting clinical information in the electronic or other health record, independently interpreting results (not separately reported) and communicating results to the patient/family/caregiver, with care coordination (not separately reported).  Medications, tests and/or procedures ordered as necessary along with referring and communicating with other health professionals (when not separately reported).           [1]   Patient Active Problem List  Diagnosis    Unspecified vitamin D deficiency    Anxiety, generalized    Intractable chronic migraine without aura and with status migrainosus    Occipital neuralgia of left side    Neck pain    Decreased range of motion of neck    Decreased strength, endurance, and mobility

## 2025-03-28 ENCOUNTER — HOSPITAL ENCOUNTER (OUTPATIENT)
Dept: RADIOLOGY | Facility: HOSPITAL | Age: 35
Discharge: HOME | End: 2025-03-28
Attending: REGISTERED NURSE
Payer: COMMERCIAL

## 2025-03-28 DIAGNOSIS — R10.9 ABDOMINAL PAIN, UNSPECIFIED ABDOMINAL LOCATION: ICD-10-CM

## 2025-03-28 PROCEDURE — 76700 US EXAM ABDOM COMPLETE: CPT | Mod: 26,,, | Performed by: RADIOLOGY

## 2025-03-28 PROCEDURE — 76700 US EXAM ABDOM COMPLETE: CPT | Mod: TC,PN

## 2025-03-31 ENCOUNTER — TELEPHONE (OUTPATIENT)
Dept: FAMILY MEDICINE | Facility: CLINIC | Age: 35
End: 2025-03-31
Payer: COMMERCIAL

## 2025-03-31 ENCOUNTER — RESULTS FOLLOW-UP (OUTPATIENT)
Dept: FAMILY MEDICINE | Facility: CLINIC | Age: 35
End: 2025-03-31

## 2025-03-31 DIAGNOSIS — R10.9 ABDOMINAL PAIN, UNSPECIFIED ABDOMINAL LOCATION: Primary | ICD-10-CM

## 2025-03-31 NOTE — TELEPHONE ENCOUNTER
----- Message from Amauri Duke NP sent at 3/31/2025 12:13 PM CDT -----  GI referral done  ----- Message -----  From: Cachorro Farmer MA  Sent: 3/31/2025  10:32 AM CDT  To: Amauri Duke NP    Patient stated she will like the referral   ----- Message -----  From: Amauri Duke NP  Sent: 3/31/2025   9:28 AM CDT  To: Leilani ROBBINS Staff    Normal abdominal US ---- for any continued issues with abdominal pain, I would suggest a full GI work-up with specialist.  ----- Message -----  From: Interface, Rad Results In  Sent: 3/28/2025   8:41 AM CDT  To: Amauri Duke NP

## 2025-04-01 ENCOUNTER — TELEPHONE (OUTPATIENT)
Dept: FAMILY MEDICINE | Facility: CLINIC | Age: 35
End: 2025-04-01
Payer: COMMERCIAL

## 2025-04-29 ENCOUNTER — TELEPHONE (OUTPATIENT)
Dept: NEUROSURGERY | Facility: CLINIC | Age: 35
End: 2025-04-29
Payer: COMMERCIAL

## 2025-04-29 ENCOUNTER — TELEPHONE (OUTPATIENT)
Dept: NEUROLOGY | Facility: CLINIC | Age: 35
End: 2025-04-29

## 2025-04-29 ENCOUNTER — OFFICE VISIT (OUTPATIENT)
Dept: NEUROLOGY | Facility: CLINIC | Age: 35
End: 2025-04-29
Payer: COMMERCIAL

## 2025-04-29 VITALS
HEIGHT: 62 IN | WEIGHT: 100.31 LBS | DIASTOLIC BLOOD PRESSURE: 72 MMHG | HEART RATE: 81 BPM | RESPIRATION RATE: 16 BRPM | SYSTOLIC BLOOD PRESSURE: 110 MMHG | BODY MASS INDEX: 18.46 KG/M2

## 2025-04-29 DIAGNOSIS — M54.2 NECK PAIN: ICD-10-CM

## 2025-04-29 DIAGNOSIS — R29.898 LEFT HAND WEAKNESS: Primary | ICD-10-CM

## 2025-04-29 DIAGNOSIS — M50.30 DDD (DEGENERATIVE DISC DISEASE), CERVICAL: ICD-10-CM

## 2025-04-29 DIAGNOSIS — M54.81 OCCIPITAL NEURALGIA OF LEFT SIDE: ICD-10-CM

## 2025-04-29 DIAGNOSIS — G43.711 INTRACTABLE CHRONIC MIGRAINE WITHOUT AURA AND WITH STATUS MIGRAINOSUS: ICD-10-CM

## 2025-04-29 DIAGNOSIS — F41.1 ANXIETY, GENERALIZED: ICD-10-CM

## 2025-04-29 PROCEDURE — 99999 PR PBB SHADOW E&M-EST. PATIENT-LVL V: CPT | Mod: PBBFAC,,, | Performed by: NURSE PRACTITIONER

## 2025-04-29 NOTE — PROGRESS NOTES
Subjective:       Patient ID: Juanita Taylor is a 34 y.o. female.    Chief Complaint: Chronic mixed headache syndrome          HPI The patient is new to me, here for evaluation of headaches. The patient reprots that she began to have headaches in her 20's while in college.  The headaches started progress in the frontal region associated with a kaleidoscope visual disturbance aura. She states the headaches went away when she discontinued birthcontrol use. She reports in June 2023 she began Junel birth control regimen and the headaches returned.  She stopped treatment and symptom resolved. The patient had an IUD placed in August 2023. She states the headache returned in Oct. 2023  but were different. The Patient reports tingling and increased pressure and throbbing to left side of head, and increased pressure behind left eye. No visual aura. Numbness and tingling and associated neurological deficits. The patient states that within the last 3 weeks she headaches have been daily with  increased pressure in left eye, no vision changes. The headaches are getting more frequent and come everyday, she began taking OTC medications nearly daily that has contributed to analgesics overuse (Rebounding headaches). The headaches are also getting more severe 6-8/10 headaches that cause significant morbidity. The headache is associated with nausea and light, sound, temperature, and smell sensitivity. The patient not vomits occasionally. Physical and emotional stressors provoke and aggravate the headache.  The headache builds up slowly towards the middle of the day or the end of the day. The headache is associated with scalp sensitivity.  The patient is unsure if  Lack of sleep is a significant trigger of the headache. Some neck pain with radiation. Poor posture. No TMJ problems.  The patient denies blurry vision and ear ringing. The headache is not positional or postural but worsens with movement and Valsalva. No history of head  trauma. No history of seizures. No history of smoking. Mild vertigo with headaches. No blacking out.  No fever, chills, or rigors. No history of significant memory loss. No history of strokes.  The patient cannot think of food that aggravates the headache. Family history is unremarkable for migraine. No family history of early dementia. Both parents have had Bell's Palsy.     Abortive therapies (tried and failed): Ibuprofen, Tylenol      Preventative therapies (tried and failed): none    Pregnancy and birth controL IUD       INTERVAL HISTORY Patient present for follow up for headache management. Patient states   Patient did not start Amitriptyline/Elavil (TCA)25 mg HS. Patient states she is not interested in any pharmacological management. Patient would like to only pursue conservative and  non surgical options. Patient has ongoing complaint of neck pain and left arm weakness. Patient physical exam is normal. I discussed MRI testing 06-  MRI BRAIN NL. 06-. MRI C-SPINE WO Trace asymmetric to the right disc osteophyte complex at C5-C6.  Trace central disc protrusion and faint annular fissure at C6-C7.  Otherwise unremarkable MR appearance of the cervical spine.  No significant spinal canal or neural foraminal stenosis.    Patient states PT in the past was helpful, patient did muscle strengthen, massage therapy and drying needling but due to insurance changes she had to end treatment sessions premature.ly due to cost.          Review of Systems   Constitutional:  Negative for activity change.   HENT: Negative.     Respiratory: Negative.     Cardiovascular: Negative.    Gastrointestinal: Negative.    Endocrine: Negative.    Genitourinary: Negative.    Musculoskeletal:  Positive for myalgias and neck pain.   Allergic/Immunologic: Negative.    Neurological:  Positive for numbness and headaches.   Hematological: Negative.    Psychiatric/Behavioral:  Positive for decreased concentration. The patient is  nervous/anxious.                No current outpatient medications on file.    Current Facility-Administered Medications:     levonorgestreL (KYLEENA) 17.5 mcg/24 hrs (5 yrs) 19.5 mg IUD 17.5 mcg, 17.5 mcg, Intrauterine, , Re Crespo MD, 17.5 mcg at 08/23/23 1510  Past Medical History:   Diagnosis Date    Anxiety     Vitamin D deficiency      History reviewed. No pertinent surgical history.  Social History     Socioeconomic History    Marital status:    Tobacco Use    Smoking status: Never    Smokeless tobacco: Never   Substance and Sexual Activity    Alcohol use: No    Drug use: No    Sexual activity: Yes     Partners: Male     Birth control/protection: I.U.D.     Social Drivers of Health     Financial Resource Strain: Low Risk  (1/2/2024)    Overall Financial Resource Strain (CARDIA)     Difficulty of Paying Living Expenses: Not hard at all   Food Insecurity: No Food Insecurity (1/2/2024)    Hunger Vital Sign     Worried About Running Out of Food in the Last Year: Never true     Ran Out of Food in the Last Year: Never true   Transportation Needs: No Transportation Needs (1/2/2024)    PRAPARE - Transportation     Lack of Transportation (Medical): No     Lack of Transportation (Non-Medical): No   Physical Activity: Insufficiently Active (1/2/2024)    Exercise Vital Sign     Days of Exercise per Week: 4 days     Minutes of Exercise per Session: 30 min   Stress: Stress Concern Present (1/2/2024)    [employer] - Occupational Stress Questionnaire     Feeling of Stress : To some extent   Housing Stability: Low Risk  (1/2/2024)    Housing Stability Vital Sign     Unable to Pay for Housing in the Last Year: No     Number of Places Lived in the Last Year: 1     Unstable Housing in the Last Year: No             Past/Current Medical/Surgical History, Past/Current Social History, Past/Current Family History and Past/Current Medications were reviewed in  detail.        Objective:           VITAL SIGNS WERE REVIEWED      GENERAL APPEARANCE:     The patient looks comfortable.    BMI 17.18 kg     No signs of respiratory distress.    Normal breathing pattern.    No dysmorphic features    Normal eye contact.     GENERAL MEDICAL EXAM:    HEENT:  Head is atraumatic normocephalic.     No tender temporal arteries. Fundoscopic (Ophthalmoscopic) exam showed no disc edema.      Neck and Axillae: No JVD. No visible lesions.    No carotid bruits. No thyromegaly. No lymphadenopathy.    Cardiopulmonary: No cyanosis. No tachypnea. Normal respiratory effort.    Gastrointestinal/Urogenital:  No jaundice. No stomas or lesions. No visible hernias. No catheters.     Abdomen is soft non-tender. No masses or organomegaly.    Skin, Hair and Nails: No pathognonomic skin rash. No neurofibromatosis. No visible lesions.No stigmata of autoimmune disease. No clubbing.    Skin is warm and moist. No palpable masses.    Limbs: No varicose veins. No visible swelling.    No palpable edema. Pulses are symmetric. Pedal pulses are palpable.      Muskoskeletal: No visible deformities.No visible lesions.    No spine tenderness. No signs of longstanding neuropathy. No dislocations or fractures.            Neurologic Exam     Mental Status   Oriented to person, place, and time.   Registration: recalls 3 of 3 objects. Recall at 5 minutes: recalls 3 of 3 objects. Follows 3 step commands.   Attention: normal. Concentration: normal.   Speech: speech is normal and not slurred   Level of consciousness: alert  Knowledge: good and consistent with education. Able to perform simple calculations.   Able to name object. Able to read. Able to repeat. Able to write. Normal comprehension.     Cranial Nerves     CN II   Visual fields full to confrontation.   Visual acuity: normal  Right visual field deficit: none  Left visual field deficit: none     CN III, IV, VI   Pupils are equal, round, and reactive to  light.  Extraocular motions are normal.   Right pupil: Size: 2 mm. Shape: regular. Reactivity: brisk. Consensual response: intact. Accommodation: intact.   Left pupil: Size: 2 mm. Shape: regular. Reactivity: brisk. Consensual response: intact. Accommodation: intact.   CN III: no CN III palsy  CN VI: no CN VI palsy  Nystagmus: none   Diplopia: none  Ophthalmoparesis: none  Upgaze: normal  Downgaze: normal  Conjugate gaze: present  Vestibulo-ocular reflex: present    CN V   Facial sensation intact.   Right facial sensation deficit: none  Left facial sensation deficit: none    CN VII   Right facial weakness: none  Left facial weakness: none  Left taste: normal    CN VIII   CN VIII normal.   Hearing: intact    CN IX, X   CN IX normal.   CN X normal.   Palate: symmetric    CN XI   CN XI normal.   Right sternocleidomastoid strength: normal  Left sternocleidomastoid strength: normal  Right trapezius strength: normal  Left trapezius strength: normal    CN XII   CN XII normal.   Tongue: not atrophic  Fasciculations: absent  Tongue deviation: none    Motor Exam   Muscle bulk: normal  Overall muscle tone: normal  Right arm tone: normal  Left arm tone: normal  Right arm pronator drift: absent  Left arm pronator drift: absent  Right leg tone: normal  Left leg tone: normal    Strength   Strength 5/5 throughout.   Right neck flexion: 5/5  Left neck flexion: 5/5  Right neck extension: 5/5  Left neck extension: 5/5  Right deltoid: 5/5  Left deltoid: 5/5  Right biceps: 5/5  Left biceps: 5/5  Right triceps: 5/5  Left triceps: 5/5  Right wrist flexion: 5/5  Left wrist flexion: 5/5  Right wrist extension: 5/5  Left wrist extension: 5/5  Right interossei: 5/5  Left interossei: 5/5  Right iliopsoas: 5/5  Left iliopsoas: 5/5  Right quadriceps: 5/5  Left quadriceps: 5/5  Right hamstrin/5  Left hamstrin/5  Right glutei: 5/5  Left glutei: 5/5  Right anterior tibial: 5/5  Left anterior tibial: 5/5  Right posterior tibial: 5/5  Left  posterior tibial: 5/5  Right peroneal: 5/5  Left peroneal: 5/5  Right gastroc: 5/5  Left gastroc: 5/5Poor posture      Sensory Exam   Light touch normal.   Right arm light touch: normal  Left arm light touch: normal  Right leg light touch: normal  Left leg light touch: normal  Vibration normal.   Right arm vibration: normal  Left arm vibration: normal  Right leg vibration: normal  Left leg vibration: normal  Proprioception normal.   Right arm proprioception: normal  Left arm proprioception: normal  Right leg proprioception: normal  Left leg proprioception: normal  Pinprick normal.   Right arm pinprick: normal  Left arm pinprick: normal  Right leg pinprick: normal  Left leg pinprick: normal  Graphesthesia: normal  Stereognosis: normal    Gait, Coordination, and Reflexes     Gait  Gait: normal    Coordination   Romberg: negative  Finger to nose coordination: normal  Heel to shin coordination: normal  Tandem walking coordination: normal    Tremor   Resting tremor: absent  Intention tremor: absent    Reflexes   Right brachioradialis: 2+  Left brachioradialis: 2+  Right biceps: 2+  Left biceps: 2+  Right triceps: 2+  Left triceps: 2+  Right patellar: 2+  Left patellar: 2+  Right achilles: 2+  Left achilles: 2+  Right : 2+  Left : 2+  Right plantar: normal  Left plantar: normal  Right Hagen: absent  Left Hagen: absent  Right ankle clonus: absent  Left ankle clonus: absent  Right pendular knee jerk: absent  Left pendular knee jerk: absent      Lab Results   Component Value Date    WBC 4.52 10/17/2024    HGB 15.1 10/17/2024    HCT 45.1 10/17/2024     (H) 10/17/2024     10/17/2024     Sodium   Date Value Ref Range Status   10/17/2024 140 136 - 145 mmol/L Final     Potassium   Date Value Ref Range Status   10/17/2024 3.7 3.5 - 5.1 mmol/L Final     Chloride   Date Value Ref Range Status   10/17/2024 104 95 - 110 mmol/L Final     CO2   Date Value Ref Range Status   10/17/2024 28 23 - 29 mmol/L Final      Glucose   Date Value Ref Range Status   10/17/2024 73 70 - 110 mg/dL Final     BUN   Date Value Ref Range Status   10/17/2024 13 6 - 20 mg/dL Final     Creatinine   Date Value Ref Range Status   10/17/2024 0.8 0.5 - 1.4 mg/dL Final     Calcium   Date Value Ref Range Status   10/17/2024 9.5 8.7 - 10.5 mg/dL Final     Total Protein   Date Value Ref Range Status   10/17/2024 7.7 6.0 - 8.4 g/dL Final     Albumin   Date Value Ref Range Status   10/17/2024 4.6 3.5 - 5.2 g/dL Final     Total Bilirubin   Date Value Ref Range Status   10/17/2024 1.1 (H) 0.1 - 1.0 mg/dL Final     Comment:     For infants and newborns, interpretation of results should be based  on gestational age, weight and in agreement with clinical  observations.    Premature Infant recommended reference ranges:  Up to 24 hours.............<8.0 mg/dL  Up to 48 hours............<12.0 mg/dL  3-5 days..................<15.0 mg/dL  6-29 days.................<15.0 mg/dL       Alkaline Phosphatase   Date Value Ref Range Status   10/17/2024 40 40 - 150 U/L Final     AST   Date Value Ref Range Status   10/17/2024 20 10 - 40 U/L Final     ALT   Date Value Ref Range Status   10/17/2024 15 10 - 44 U/L Final     Anion Gap   Date Value Ref Range Status   10/17/2024 8 8 - 16 mmol/L Final     Lab Results   Component Value Date    PSOANOQZ87 785 10/17/2024     Lab Results   Component Value Date    TSH 1.437 10/17/2024    FREET4 1.08 08/30/2022     RADIOLOGY EVALUATION:         MRI BRAIN WO:     06-     MRI BRAIN NL     06-    MRI C-SPINE WO    Trace asymmetric to the right disc osteophyte complex at C5-C6.  Trace central disc protrusion and faint annular fissure at C6-C7.     Otherwise unremarkable MR appearance of the cervical spine.  No significant spinal canal or neural foraminal stenosis.        No results found in the last 24 hours.  No results found in the last 24 hours.      Reviewed the neuroimaging independently       Assessment:       1. Left  hand weakness    2. Anxiety, generalized    3. Occipital neuralgia of left side    4. Neck pain    5. Intractable chronic migraine without aura and with status migrainosus    6. DDD (degenerative disc disease), cervical          Plan:           CHRONIC MIXED HEADACHES SYNDROME/ OCCIPITAL NEURALGIA/ CHRONIC MIGRAINE -TENSION TYPE HEADACHES W/WO AURAS/ANALGESIC REBOUNDING HEADACHES       Recommend PT to evaluate and treat (massage therapy and dry needling)     HEADACHE DIARY     DISCUSSED THE THREE-FOLD MANAGEMENT OF MIGRAINE:      LIFESTYLE CHANGES:       Good sleep hygiene  Avoid general triggers like lack of sleep/too much sleep, prolonged sun exposure, excessive screen time and specific triggers based on you own diary   Minimize physical and emotional stress  Smoking avoidance and cessation  Limit caffeine drinks to 1-2 a day   Good hydration   Small frequent meals and avoid skipping meals   Moderate 30-minute-long aerobic exercises 3 times/week. Avoid strenuous exercise         ACUTE ABORTIVE     Stop excessive OTC use of medications causing rebounding headaches, do not take OTC more than 3 times per week      Give for acute HA : Prednisone 50 mg po 5 days, Toradol 10 mg po one time dose, Compazine 10 mg po HS 5 days.        LAST RESORT:     DHE NS Trudhesa (Max 2 a week)     C/I: concomitant use of vasoconstrictors like Triptans, strong CY inhibitors such as HAART PIs (eg, ritonavir, nelfinavir, or indinavir) and Macrolides (eg, erythromycin or clarithromycin), CAD, PVD, Stroke/TIA and Uncontrolled HTN.  Serious SEs include Vasospasm and Fibrosis (chronic use).       PREVENTATIVE (MORE ACCURATELY MIGRAINE REDUCTION) MEDICATIONS:     Since the patient's headache is very frequent a lengthy discussion about preventative medications was carried out.The patient declined.     Acupuncture, massage therapy and essential oils.    HELPFUL SUPPLEMENTS:     Helpful supplements include Co-Q 10, B2, Mg, Feverfew (Dolovent  combination) and butterbur (Petadolex)    NEXT OPTIONS:       Patient declined Amitriptyline/Elavil (TCA) slow titration to 25 mg     Topiramate/Topamax (TPM) slow titration to 50 mg BID which can cause mental slowing, transient tingling, kidney stones, weight loss, cleft lip and palate and rarely glaucoma and visual field defects . The patient was encouraged to drink a lot of fluids.     Amitriptyline/Elavil (TCA) slow titration to 100-Age which can cause sleepiness, dry eyes, dry mouth, urinary retention, and rarely cardiac arrhythmias    Propranolol/Inderal  (BB)slow titration to 80 mg BID which can cause low blood pressure, slow heart rate, erectile dysfunction, depression, airway obstruction and heart failure exacerbations. Cannot be used with migraine associate with focal neurological deficits.    Lamotrigine/Lamictal  (LTG)slow titration to 100 mg BID which can cause serious skin rash and rare cardiac arrhythmias. LTG is superior to other therapies for specifically reducing migraine aura.     ANTI-CGRP AGENTS: Qulipta (alogepant) 60 mg QD, Erenumab (Aimovig) 140 mg SQ Pen monthly (Reported cases of Constipation and BP elevation) , Galcanezumab (Emgality) 120 mg SQ Pen monthly after a loading dose of 240 mg  and Fremnezumab (Ajovy) (Ligand Blocker): 225 mg SQ monthly or 675 mg every 3 months     Botox 200 units every 3 months .        LAST RESORT OPTIONS:      Namenda 10 mg BID     Neuromodulation: Cefaly, Relivion     Valproic acid/ Depakote       OFF LABEL-EXPERIMENTAL     Migraine surgery.    Acupuncture, massage therapy and essential oils.        CHRONIC NECK PAIN AND LEFT ARM AND HAND WEAKNESS    EMG NCS     PT/OT to  evaluate and treat     MEDICAL/SURGICAL COMORBIDITIES     All relevant medical comorbidities noted and managed by primary care physician and medical care team.          MISCELLANEOUS MEDICAL PROBLEMS       HEALTHY LIFESTYLE AND PREVENTATIVE CARE    Encouraged the patient to adhere to the  age-appropriate health maintenance guidelines including screening tests and vaccinations.     Discussed the overall importance of healthy lifestyle, optimal weight, exercise, healthy diet, good sleep hygiene and avoiding drugs including smoking, alcohol and recreational drugs. The patient verbalized full understanding.       Advised the patient to follow COVID-19 prevention measures.       I spent 40 minutes more than 50 % spent  face to face with the patient    Time spent in counseling and coordination of care including discussions etiology of diagnosis, pathogenesis of diagnosis, prognosis of diagnosis,, diagnostic results, impression and recommendations, diagnostic studies, management, risks and benefits of treatment, instructions of disease self-management, treatment instructions, follow up requirements, patient and family counseling/involvement in care compliance with treatment regimen. All of the patient's questions were answered during this discussion.       Jacki Elder, MSN NP      Collaborating Provider: Adriana Staples MD, FAAN Neurologist/Epileptologist

## 2025-05-12 ENCOUNTER — PROCEDURE VISIT (OUTPATIENT)
Dept: NEUROLOGY | Facility: CLINIC | Age: 35
End: 2025-05-12
Payer: COMMERCIAL

## 2025-05-12 ENCOUNTER — RESULTS FOLLOW-UP (OUTPATIENT)
Dept: NEUROLOGY | Facility: CLINIC | Age: 35
End: 2025-05-12

## 2025-05-12 DIAGNOSIS — M54.2 NECK PAIN: ICD-10-CM

## 2025-05-12 DIAGNOSIS — M54.81 OCCIPITAL NEURALGIA OF LEFT SIDE: ICD-10-CM

## 2025-05-12 DIAGNOSIS — F41.1 ANXIETY, GENERALIZED: ICD-10-CM

## 2025-05-12 DIAGNOSIS — R29.898 LEFT HAND WEAKNESS: ICD-10-CM

## 2025-05-12 PROCEDURE — 95909 NRV CNDJ TST 5-6 STUDIES: CPT | Mod: S$GLB,,, | Performed by: PSYCHIATRY & NEUROLOGY

## 2025-05-12 NOTE — PROGRESS NOTES
EMG/NCS:    05-    This is a normal study.      There is no electrophysiologic evidence of left median mononeuropathy across the wrist (carpal tunnel syndrome).

## 2025-05-12 NOTE — PROCEDURES
[facility]   Kaitlynn Soliman  Department of Neurology  [address] [place] [address] HELIO Banerjee  [id number]  Phone [phone]     Fax [phone]        Full Name: Juanita Antoine Gender: Female  Patient ID: [id number] YOB: 1990      Visit Date: 5/12/2025 10:12 AM  Age: 34 Years  Examining Physician: Adriana Staples M.D.  Referring Physician: Jacki Elder NP  Technician: BEN Nation  History: Left CTS workup. Indication for study: Left hand weakness. PMHX: Anxiety, Vitamin D deficiency, occipital neuralgia, cervical DDD, migraine.    SUMMARY     Nerve conduction studies were performed in the left upper extremity. The left median motor study recording the abductor pollicis brevis showed a normal amplitude, normal distal latency, and normal conduction velocity. The left ulnar motor study recording the abductor digiti minimi showed a normal amplitude, normal distal latency, and normal conduction velocity. No conduction block or focal slowing was present across the elbow.     The left median sensory response recording digit two showed a normal amplitude, latency, and conduction velocity. The left ulnar sensory response recording digit five showed a normal amplitude, latency, and conduction velocity. The left radial sensory response recording over the extensor snuff box showed a normal amplitude, latency, and conduction velocity.     As routine median motor and sensory studies have a false negative rate of 25%, additional internal comparison studies were done to assess for possible median neuropathy at the wrist. These internal comparison studies (median vs. ulnar palmar mixed; median vs. ulnar sensory recording digit four; median vs. ulnar motor studies to the second lumbrical / interosseous; median vs. radial sensory studies recording digit one; median segmental sensory studies comparing the wrist-palm and palm-digit velocities) increase the electrodiagnostic sensitivity rate  to 95%. However, due to statistical issues with multiple tests, it is required that at least two studies are abnormal to reduce the false positive rate to acceptable levels. Left median-ulnar mixed palmar latencies showed no significant difference.    IMPRESSION     This is a normal study.     There is no electrophysiologic evidence of left median mononeuropathy across the wrist (carpal tunnel syndrome).    ---------------------------------             Adriana Staples M.D., ROSIE      Diplomate, American Board of Psychiatry and Neurology  Diplomate, American Board of Clinical Neurophysiology  Fellow, American Academy of Neurology             SENSORY NCS      Nerve / Sites Rec. Site Peak NP Amp PP Amp Dist Nicolas d Lat.2     ms µV µV cm m/s ms   L Median - Dig II      Wrist II 3.21 37.9 42.0 13 51.1 3.21      Ref.  3.40  20.0  48.0    L Median - Ulnar - Palmar      Median Wrist 2.10 70.3 67.4 8 52.6 2.10      Ulnar Wrist 2.31 30.8 35.6 8 640.0 0.21   L Ulnar - Dig V      Wrist Dig V 3.08 25.8 30.9 11 44.4 3.08      Ref.  3.10  12.0  48.0    L Radial - Snuff box      Forearm Snuff box 2.67 53.1 46.4 10 53.9 2.67      Ref.  2.70 18.0           MOTOR NCS      Nerve / Sites Rec. Site Lat Amp Dist Nicolas     ms mV cm m/s   L Median - APB      Wrist APB 3.25 14.3 8       Ref.  3.90 6.0        Elbow APB 6.48 13.6 20 61.9      Ref.     49.0   L Ulnar - ADM      Wrist ADM 3.06 13.4 8       Ref.  3.10 7.0        B. Elbow ADM 6.04 11.9 18 60.4      Ref.     50.0      A. Elbow ADM 7.65 11.3 10 62.3                         ---------------------------------             Adriana Staples M.D., F.A.A.NAvinash      Diplomate, American Board of Psychiatry and Neurology  Diplomate, American Board of Clinical Neurophysiology  Fellow, American Academy of Neurology

## 2025-05-13 ENCOUNTER — OFFICE VISIT (OUTPATIENT)
Dept: URGENT CARE | Facility: CLINIC | Age: 35
End: 2025-05-13
Payer: COMMERCIAL

## 2025-05-13 VITALS
HEART RATE: 86 BPM | TEMPERATURE: 99 F | OXYGEN SATURATION: 98 % | SYSTOLIC BLOOD PRESSURE: 128 MMHG | DIASTOLIC BLOOD PRESSURE: 67 MMHG | WEIGHT: 102.63 LBS | HEIGHT: 62 IN | RESPIRATION RATE: 18 BRPM | BODY MASS INDEX: 18.89 KG/M2

## 2025-05-13 DIAGNOSIS — B34.9 VIRAL ILLNESS: Primary | ICD-10-CM

## 2025-05-13 DIAGNOSIS — J04.0 LARYNGITIS: ICD-10-CM

## 2025-05-13 DIAGNOSIS — M94.0 COSTOCHONDRITIS: ICD-10-CM

## 2025-05-13 DIAGNOSIS — R09.89 CHEST CONGESTION: ICD-10-CM

## 2025-05-13 PROCEDURE — 99213 OFFICE O/P EST LOW 20 MIN: CPT | Mod: S$GLB,,, | Performed by: PHYSICIAN ASSISTANT

## 2025-05-13 RX ORDER — PREDNISONE 20 MG/1
20 TABLET ORAL DAILY
Qty: 5 TABLET | Refills: 0 | Status: SHIPPED | OUTPATIENT
Start: 2025-05-13 | End: 2025-05-18

## 2025-05-13 NOTE — PROGRESS NOTES
"Subjective:      Patient ID: Juanita Taylor is a 34 y.o. female.    Vitals:  height is 5' 2" (1.575 m) and weight is 46.6 kg (102 lb 10 oz). Her tympanic temperature is 98.6 °F (37 °C). Her blood pressure is 128/67 and her pulse is 86. Her respiration is 18 and oxygen saturation is 98%.     Chief Complaint: Sore Throat    Pt presents with sore throat, congestion, cough, hoarse voice, and chest discomfort. Symptoms started on 05/08. Pt has taken cough n cold meds and Dimetapp with some relief. Daughter with similar symptoms presently.  No family history of cardiac disease.  States chest discomfort is worse with a deep breath.  No SOB.  No elevated HR.      Sore Throat   This is a new problem. The current episode started in the past 7 days. The problem has been unchanged. Neither side of throat is experiencing more pain than the other. There has been no fever. The pain is at a severity of 5/10. The pain is mild. Associated symptoms include congestion, coughing and a hoarse voice. Pertinent negatives include no abdominal pain, diarrhea, drooling, ear discharge, ear pain, headaches, plugged ear sensation, neck pain, shortness of breath, stridor, swollen glands, trouble swallowing or vomiting. She has had no exposure to strep or mono. Treatments tried: cough n cold and Dimetabs. The treatment provided no relief.       HENT:  Positive for congestion and sore throat. Negative for ear pain, ear discharge, drooling and trouble swallowing.    Neck: Negative for neck pain.   Respiratory:  Positive for cough. Negative for shortness of breath and stridor.    Gastrointestinal:  Negative for abdominal pain, vomiting and diarrhea.   Neurological:  Negative for headaches.      Objective:     Physical Exam   Constitutional: She is oriented to person, place, and time. She appears well-developed.   HENT:   Head: Normocephalic and atraumatic.   Ears:   Right Ear: Hearing, tympanic membrane, external ear and ear canal normal.   Left " Ear: Hearing, tympanic membrane, external ear and ear canal normal.   Nose: Rhinorrhea present.   Mouth/Throat: Uvula is midline, oropharynx is clear and moist and mucous membranes are normal. Mucous membranes are moist. No tonsillar exudate.   Eyes: Conjunctivae and EOM are normal. Pupils are equal, round, and reactive to light.   Neck: Neck supple.   Cardiovascular: Normal rate, regular rhythm, normal heart sounds and normal pulses.   Pulmonary/Chest: Effort normal and breath sounds normal. She exhibits bony tenderness.       Musculoskeletal: Normal range of motion.         General: Normal range of motion.   Neurological: She is alert and oriented to person, place, and time.   Skin: Skin is warm and dry.   Vitals reviewed.      Assessment:     1. Viral illness    2. Laryngitis    3. Chest congestion        Plan:       Viral illness    Laryngitis    Chest congestion        Advise increase p.o. fluids--at least 64 ounces of water/juice & rest  Meds: Prednisone sent to pharmacy.  Continue with OTC cough and cold meds.  Add daily Flonase.  Normal saline nasal wash to irrigate sinuses and for congestion/runny nose.  Cool mist humidifier/vaporizer.  Practice good handwashing.  Mucinex for cough and chest congestion.  Tylenol or Ibuprofen for fever, headache and body aches.  Warm salt water gargles for throat comfort.  Chloraseptic spray or lozenges for throat comfort.  See PCP or go to ER if symptoms worsen or fail to improve with treatment.  Go to ER with worsening Chest pain or SOB.

## 2025-06-17 ENCOUNTER — PATIENT MESSAGE (OUTPATIENT)
Dept: NEUROLOGY | Facility: CLINIC | Age: 35
End: 2025-06-17
Payer: COMMERCIAL

## 2025-07-21 ENCOUNTER — TELEPHONE (OUTPATIENT)
Dept: FAMILY MEDICINE | Facility: CLINIC | Age: 35
End: 2025-07-21
Payer: COMMERCIAL

## 2025-07-28 ENCOUNTER — OFFICE VISIT (OUTPATIENT)
Dept: FAMILY MEDICINE | Facility: CLINIC | Age: 35
End: 2025-07-28
Payer: COMMERCIAL

## 2025-07-28 ENCOUNTER — LAB VISIT (OUTPATIENT)
Dept: LAB | Facility: HOSPITAL | Age: 35
End: 2025-07-28
Attending: FAMILY MEDICINE
Payer: COMMERCIAL

## 2025-07-28 VITALS
DIASTOLIC BLOOD PRESSURE: 66 MMHG | SYSTOLIC BLOOD PRESSURE: 102 MMHG | TEMPERATURE: 97 F | BODY MASS INDEX: 18.13 KG/M2 | HEART RATE: 96 BPM | WEIGHT: 102.31 LBS | HEIGHT: 63 IN | OXYGEN SATURATION: 96 %

## 2025-07-28 DIAGNOSIS — Z00.00 PREVENTATIVE HEALTH CARE: ICD-10-CM

## 2025-07-28 DIAGNOSIS — Z00.00 PREVENTATIVE HEALTH CARE: Primary | ICD-10-CM

## 2025-07-28 DIAGNOSIS — E55.9 VITAMIN D DEFICIENCY DISEASE: ICD-10-CM

## 2025-07-28 DIAGNOSIS — M50.30 DDD (DEGENERATIVE DISC DISEASE), CERVICAL: ICD-10-CM

## 2025-07-28 DIAGNOSIS — G43.711 INTRACTABLE CHRONIC MIGRAINE WITHOUT AURA AND WITH STATUS MIGRAINOSUS: ICD-10-CM

## 2025-07-28 PROBLEM — R29.898 DECREASED RANGE OF MOTION OF NECK: Status: RESOLVED | Noted: 2024-06-14 | Resolved: 2025-07-28

## 2025-07-28 PROBLEM — F41.1 ANXIETY, GENERALIZED: Status: RESOLVED | Noted: 2017-04-03 | Resolved: 2025-07-28

## 2025-07-28 PROBLEM — Z74.09 DECREASED STRENGTH, ENDURANCE, AND MOBILITY: Status: RESOLVED | Noted: 2024-06-14 | Resolved: 2025-07-28

## 2025-07-28 PROBLEM — R68.89 DECREASED STRENGTH, ENDURANCE, AND MOBILITY: Status: RESOLVED | Noted: 2024-06-14 | Resolved: 2025-07-28

## 2025-07-28 PROBLEM — R53.1 DECREASED STRENGTH, ENDURANCE, AND MOBILITY: Status: RESOLVED | Noted: 2024-06-14 | Resolved: 2025-07-28

## 2025-07-28 PROBLEM — M54.2 NECK PAIN: Status: RESOLVED | Noted: 2024-06-14 | Resolved: 2025-07-28

## 2025-07-28 LAB
25(OH)D3+25(OH)D2 SERPL-MCNC: 104 NG/ML (ref 30–96)
ABSOLUTE EOSINOPHIL (OHS): 0.07 K/UL
ABSOLUTE MONOCYTE (OHS): 0.3 K/UL (ref 0.3–1)
ABSOLUTE NEUTROPHIL COUNT (OHS): 2.06 K/UL (ref 1.8–7.7)
ALBUMIN SERPL BCP-MCNC: 4.5 G/DL (ref 3.5–5.2)
ALP SERPL-CCNC: 36 UNIT/L (ref 40–150)
ALT SERPL W/O P-5'-P-CCNC: 17 UNIT/L (ref 0–55)
ANION GAP (OHS): 9 MMOL/L (ref 8–16)
AST SERPL-CCNC: 27 UNIT/L (ref 0–50)
BASOPHILS # BLD AUTO: 0.03 K/UL
BASOPHILS NFR BLD AUTO: 0.8 %
BILIRUB SERPL-MCNC: 0.6 MG/DL (ref 0.1–1)
BUN SERPL-MCNC: 13 MG/DL (ref 6–20)
CALCIUM SERPL-MCNC: 8.7 MG/DL (ref 8.7–10.5)
CHLORIDE SERPL-SCNC: 106 MMOL/L (ref 95–110)
CHOLEST SERPL-MCNC: 139 MG/DL (ref 120–199)
CHOLEST/HDLC SERPL: 2.5 {RATIO} (ref 2–5)
CK SERPL-CCNC: 86 U/L (ref 20–180)
CO2 SERPL-SCNC: 23 MMOL/L (ref 23–29)
CREAT SERPL-MCNC: 0.7 MG/DL (ref 0.5–1.4)
ERYTHROCYTE [DISTWIDTH] IN BLOOD BY AUTOMATED COUNT: 11.9 % (ref 11.5–14.5)
ERYTHROCYTE [SEDIMENTATION RATE] IN BLOOD BY PHOTOMETRIC METHOD: 3 MM/HR
GFR SERPLBLD CREATININE-BSD FMLA CKD-EPI: >60 ML/MIN/1.73/M2
GLUCOSE SERPL-MCNC: 83 MG/DL (ref 70–110)
HCT VFR BLD AUTO: 41.8 % (ref 37–48.5)
HCV AB SERPL QL IA: NORMAL
HDLC SERPL-MCNC: 56 MG/DL (ref 40–75)
HDLC SERPL: 40.3 % (ref 20–50)
HGB BLD-MCNC: 13.6 GM/DL (ref 12–16)
IMM GRANULOCYTES # BLD AUTO: 0.02 K/UL (ref 0–0.04)
IMM GRANULOCYTES NFR BLD AUTO: 0.5 % (ref 0–0.5)
LDLC SERPL CALC-MCNC: 75.2 MG/DL (ref 63–159)
LYMPHOCYTES # BLD AUTO: 1.49 K/UL (ref 1–4.8)
MCH RBC QN AUTO: 33.7 PG (ref 27–31)
MCHC RBC AUTO-ENTMCNC: 32.5 G/DL (ref 32–36)
MCV RBC AUTO: 104 FL (ref 82–98)
NONHDLC SERPL-MCNC: 83 MG/DL
NUCLEATED RBC (/100WBC) (OHS): 0 /100 WBC
PLATELET # BLD AUTO: 216 K/UL (ref 150–450)
PMV BLD AUTO: 11.4 FL (ref 9.2–12.9)
POTASSIUM SERPL-SCNC: 3.5 MMOL/L (ref 3.5–5.1)
PROT SERPL-MCNC: 7.1 GM/DL (ref 6–8.4)
RBC # BLD AUTO: 4.04 M/UL (ref 4–5.4)
RELATIVE EOSINOPHIL (OHS): 1.8 %
RELATIVE LYMPHOCYTE (OHS): 37.5 % (ref 18–48)
RELATIVE MONOCYTE (OHS): 7.6 % (ref 4–15)
RELATIVE NEUTROPHIL (OHS): 51.8 % (ref 38–73)
RHEUMATOID FACT SERPL-ACNC: <13 IU/ML
SODIUM SERPL-SCNC: 138 MMOL/L (ref 136–145)
TRIGL SERPL-MCNC: 39 MG/DL (ref 30–150)
TSH SERPL-ACNC: 1.52 UIU/ML (ref 0.4–4)
WBC # BLD AUTO: 3.97 K/UL (ref 3.9–12.7)

## 2025-07-28 PROCEDURE — 3074F SYST BP LT 130 MM HG: CPT | Mod: CPTII,S$GLB,, | Performed by: FAMILY MEDICINE

## 2025-07-28 PROCEDURE — 99999 PR PBB SHADOW E&M-EST. PATIENT-LVL III: CPT | Mod: PBBFAC,,, | Performed by: FAMILY MEDICINE

## 2025-07-28 PROCEDURE — 82306 VITAMIN D 25 HYDROXY: CPT

## 2025-07-28 PROCEDURE — 1160F RVW MEDS BY RX/DR IN RCRD: CPT | Mod: CPTII,S$GLB,, | Performed by: FAMILY MEDICINE

## 2025-07-28 PROCEDURE — 99395 PREV VISIT EST AGE 18-39: CPT | Mod: S$GLB,,, | Performed by: FAMILY MEDICINE

## 2025-07-28 PROCEDURE — 3078F DIAST BP <80 MM HG: CPT | Mod: CPTII,S$GLB,, | Performed by: FAMILY MEDICINE

## 2025-07-28 PROCEDURE — 1159F MED LIST DOCD IN RCRD: CPT | Mod: CPTII,S$GLB,, | Performed by: FAMILY MEDICINE

## 2025-07-28 PROCEDURE — 85025 COMPLETE CBC W/AUTO DIFF WBC: CPT

## 2025-07-28 PROCEDURE — 3008F BODY MASS INDEX DOCD: CPT | Mod: CPTII,S$GLB,, | Performed by: FAMILY MEDICINE

## 2025-07-28 PROCEDURE — 82550 ASSAY OF CK (CPK): CPT

## 2025-07-28 PROCEDURE — 80053 COMPREHEN METABOLIC PANEL: CPT

## 2025-07-28 PROCEDURE — 80061 LIPID PANEL: CPT

## 2025-07-28 PROCEDURE — 84443 ASSAY THYROID STIM HORMONE: CPT

## 2025-07-28 PROCEDURE — 85652 RBC SED RATE AUTOMATED: CPT

## 2025-07-28 PROCEDURE — 86803 HEPATITIS C AB TEST: CPT

## 2025-07-28 PROCEDURE — 86038 ANTINUCLEAR ANTIBODIES: CPT

## 2025-07-28 PROCEDURE — 36415 COLL VENOUS BLD VENIPUNCTURE: CPT | Mod: PO

## 2025-07-28 PROCEDURE — 86431 RHEUMATOID FACTOR QUANT: CPT

## 2025-07-28 NOTE — PROGRESS NOTES
CHIEF COMPLAINT:  This is a 34-year-old female here  to establish care and for preventive health exam.    SUBJECTIVE:  The patient is doing well without complaints.  She has a history of chronic neck pain with degenerative disc disease at C5-6 per MRI cervical spine and migraine headaches with aura.  She was also diagnosed with occipital neuralgia of the left side.  She takes no medication.  She has a history of vitamin-D deficiency for which she takes supplemental vitamin-D as well as magnesium and Co Q10.  Patient complains of episodes of pain in bones particularly fingertips which has resolved, chest pain, foggy headedness and anxiety.  She reports over worrying about her health.  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  She is  and has 3 young children.    Eye exam December 2024.  Pap smear May 2025.  Tdap June 2024.  COVID-19 vaccine September, October 2021.  Influenza vaccine October 2024.      Past Medical History:   Diagnosis Date    Anxiety     DDD (degenerative disc disease), cervical     C5-C6    Migraine headache     Occipital neuralgia of left side     Vitamin D deficiency      No past surgical history on file.    Social History     Socioeconomic History    Marital status:    Tobacco Use    Smoking status: Never    Smokeless tobacco: Never   Substance and Sexual Activity    Alcohol use: No    Drug use: No    Sexual activity: Yes     Partners: Male     Birth control/protection: I.U.D.   Social History Narrative    The patient is  with 3 children. 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     Social Drivers of Health     Financial Resource Strain: Low Risk  (7/27/2025)    Overall Financial Resource Strain (CARDIA)     Difficulty of Paying Living Expenses: Not hard at all   Food Insecurity: No Food Insecurity (7/27/2025)    Hunger Vital Sign     Worried About Running Out of Food in the Last Year: Never true     Ran Out of Food in the Last Year: Never true   Transportation  Needs: No Transportation Needs (7/27/2025)    PRAPARE - Transportation     Lack of Transportation (Medical): No     Lack of Transportation (Non-Medical): No   Physical Activity: Insufficiently Active (7/27/2025)    Exercise Vital Sign     Days of Exercise per Week: 1 day     Minutes of Exercise per Session: 10 min   Stress: Stress Concern Present (7/27/2025)    [employer] [employer] - Occupational Stress Questionnaire     Feeling of Stress : To some extent   Housing Stability: Low Risk  (7/27/2025)    Housing Stability Vital Sign     Unable to Pay for Housing in the Last Year: No     Number of Times Moved in the Last Year: 0     Homeless in the Last Year: No       Family History   Problem Relation Name Age of Onset    Bell's palsy Mother      Hypertension Father Hindu     No Known Problems Sister      No Known Problems Sister      Hyperlipidemia Maternal Grandmother Yoly     Miscarriages / Stillbirths Maternal Grandmother Yoly     Lung cancer Maternal Grandfather      Hypertension Paternal Grandmother Ira García     Heart failure Paternal Grandmother Ira García     Diabetes Paternal Grandfather Migue     Hypertension Paternal Grandfather Migue     Atrial fibrillation Paternal Grandfather Migue     Breast cancer Maternal Great-Aunt      Multiple sclerosis Paternal Great-Aunt         ROS:  GENERAL: Patient denies fever, chills, night sweats.  Patient denies weight gain or loss. Patient denies anorexia, fatigue, weakness or swollen glands.  SKIN: Patient denies rash or hair loss.  HEENT: Patient denies sore throat, ear pain, hearing loss, nasal congestion, or runny nose. Patient denies visual disturbance, eye irritation or discharge.  LUNGS: Patient denies cough, wheeze or hemoptysis.  CARDIOVASCULAR: Patient denies shortness of breath, palpitations, syncope or lower extremity edema.  Positive for chest pain.  GI: Patient denies abdominal pain, nausea, vomiting, diarrhea, constipation, blood in  stool or melena.  GENITOURINARY: Patient denies pelvic pain, vaginal discharge, itch or odor. Patient denies irregular vaginal bleeding.  Patient denies dysuria, frequency, hematuria, nocturia, urgency or incontinence.  BREASTS: Patient denies breast pain, mass or nipple discharge.  MUSCULOSKELETAL: Patient denies joint pain, swelling, redness or warmth.  NEUROLOGIC: Patient denies vertigo, paresthesias, weakness in limb, dysarthria, dysphagia or abnormality of gait.  Positive for headaches.  PSYCHIATRIC: Patient denies depression, or memory loss.  Positive for anxiety.    OBJECTIVE:   GENERAL: Well-developed well-nourished pleasant white female alert and oriented x3, in no acute distress.  Memory, judgment and cognition without deficit.   SKIN: Clear without rash.  Normal color and tone.  HEENT: Eyes: Clear conjunctivae. No scleral icterus.  Pupils equal reactive to light and accommodation.  Ears: Clear canals. Clear TMs.  Nose: Without congestion.  Pharynx: Without injection or exudates.  NECK: Supple, normal range of motion.  No masses, lymphadenopathy or enlarged thyroid.  No JVD.  Carotids 2+ and equal.  No bruits.  LUNGS: Clear to auscultation.  Normal respiratory effort.  CARDIOVASCULAR:  Regular rhythm, normal S1, S2 without murmur, gallop or rub.  BACK:  No CVA or spinal tenderness.  ABDOMEN: Normal appearance.  Active bowel sounds.  Soft, nontender without mass or organomegaly.  No rebound or guarding.  EXTREMITIES: Without cyanosis, clubbing or edema.  Distal pulses 2+ and equal.  Normal range of motion in all extremities.  No joint effusion, erythema or warmth.  NEUROLOGIC: Cranial nerves II through XII without deficit.  Motor strength equal bilaterally.  Sensation normal to touch.  Deep tendon reflexes 2+ and equal.  Gait without abnormality.  No tremor.  Negative cerebellar signs.  PELVIC:  Deferred to OBGYN.    ASSESSMENT:  1. Preventative health care    2. DDD (degenerative disc disease), cervical     3. Vitamin D deficiency disease    4. Intractable chronic migraine without aura and with status migrainosus      PLAN:   1. Stay active.  Exercise regularly.  2. Age-appropriate counseling.  3. Maintenance lab including vitamin-D level and autoimmune workup.    4. Reassurance.    5. Follow-up annually.      This note is generated with speech recognition software and is subject to transcription error and sound alike phrases that may be missed by proofreading.

## 2025-07-29 LAB — ANA (OHS): NORMAL
